# Patient Record
Sex: MALE | Race: WHITE | NOT HISPANIC OR LATINO | Employment: FULL TIME | URBAN - NONMETROPOLITAN AREA
[De-identification: names, ages, dates, MRNs, and addresses within clinical notes are randomized per-mention and may not be internally consistent; named-entity substitution may affect disease eponyms.]

---

## 2020-11-14 ENCOUNTER — HOSPITAL ENCOUNTER (OUTPATIENT)
Facility: HOSPITAL | Age: 55
Setting detail: OBSERVATION
Discharge: HOME | End: 2020-11-17
Attending: EMERGENCY MEDICINE | Admitting: FAMILY MEDICINE

## 2020-11-14 ENCOUNTER — APPOINTMENT (OUTPATIENT)
Dept: GENERAL RADIOLOGY | Facility: HOSPITAL | Age: 55
End: 2020-11-14

## 2020-11-14 ENCOUNTER — APPOINTMENT (OUTPATIENT)
Dept: CT IMAGING | Facility: HOSPITAL | Age: 55
End: 2020-11-14

## 2020-11-14 DIAGNOSIS — R07.9 CHEST PAIN, UNSPECIFIED TYPE: ICD-10-CM

## 2020-11-14 DIAGNOSIS — I10 ACCELERATED HYPERTENSION: Primary | ICD-10-CM

## 2020-11-14 PROBLEM — R07.89 CHEST PAIN, ATYPICAL: Status: ACTIVE | Noted: 2020-11-14

## 2020-11-14 PROBLEM — E11.9 DM (DIABETES MELLITUS) (HCC): Status: ACTIVE | Noted: 2020-11-14

## 2020-11-14 LAB
ALBUMIN SERPL-MCNC: 4.5 G/DL (ref 3.5–5.2)
ALBUMIN/GLOB SERPL: 1.8 G/DL
ALP SERPL-CCNC: 72 U/L (ref 39–117)
ALT SERPL W P-5'-P-CCNC: 118 U/L (ref 1–41)
ANION GAP SERPL CALCULATED.3IONS-SCNC: 12 MMOL/L (ref 5–15)
AST SERPL-CCNC: 60 U/L (ref 1–40)
BASOPHILS # BLD AUTO: 0.15 10*3/MM3 (ref 0–0.2)
BASOPHILS NFR BLD AUTO: 1.5 % (ref 0–1.5)
BILIRUB SERPL-MCNC: 0.4 MG/DL (ref 0–1.2)
BUN SERPL-MCNC: 13 MG/DL (ref 6–20)
BUN/CREAT SERPL: 18.1 (ref 7–25)
CALCIUM SPEC-SCNC: 9.1 MG/DL (ref 8.6–10.5)
CHLORIDE SERPL-SCNC: 100 MMOL/L (ref 98–107)
CO2 SERPL-SCNC: 27 MMOL/L (ref 22–29)
CREAT SERPL-MCNC: 0.72 MG/DL (ref 0.76–1.27)
DEPRECATED RDW RBC AUTO: 45.8 FL (ref 37–54)
EOSINOPHIL # BLD AUTO: 0.33 10*3/MM3 (ref 0–0.4)
EOSINOPHIL NFR BLD AUTO: 3.2 % (ref 0.3–6.2)
ERYTHROCYTE [DISTWIDTH] IN BLOOD BY AUTOMATED COUNT: 15.3 % (ref 12.3–15.4)
GFR SERPL CREATININE-BSD FRML MDRD: 113 ML/MIN/1.73
GFR SERPL CREATININE-BSD FRML MDRD: 137 ML/MIN/1.73
GLOBULIN UR ELPH-MCNC: 2.5 GM/DL
GLUCOSE SERPL-MCNC: 135 MG/DL (ref 65–99)
HCT VFR BLD AUTO: 54.1 % (ref 37.5–51)
HGB BLD-MCNC: 17.9 G/DL (ref 13–17.7)
HOLD SPECIMEN: NORMAL
HOLD SPECIMEN: NORMAL
IMM GRANULOCYTES # BLD AUTO: 0.04 10*3/MM3 (ref 0–0.05)
IMM GRANULOCYTES NFR BLD AUTO: 0.4 % (ref 0–0.5)
LYMPHOCYTES # BLD AUTO: 2.64 10*3/MM3 (ref 0.7–3.1)
LYMPHOCYTES NFR BLD AUTO: 26 % (ref 19.6–45.3)
MCH RBC QN AUTO: 28.5 PG (ref 26.6–33)
MCHC RBC AUTO-ENTMCNC: 33.1 G/DL (ref 31.5–35.7)
MCV RBC AUTO: 86.1 FL (ref 79–97)
MONOCYTES # BLD AUTO: 0.88 10*3/MM3 (ref 0.1–0.9)
MONOCYTES NFR BLD AUTO: 8.7 % (ref 5–12)
NEUTROPHILS NFR BLD AUTO: 6.13 10*3/MM3 (ref 1.7–7)
NEUTROPHILS NFR BLD AUTO: 60.2 % (ref 42.7–76)
NRBC BLD AUTO-RTO: 0 /100 WBC (ref 0–0.2)
PLATELET # BLD AUTO: 183 10*3/MM3 (ref 140–450)
PMV BLD AUTO: 10.5 FL (ref 6–12)
POTASSIUM SERPL-SCNC: 3.8 MMOL/L (ref 3.5–5.2)
PROT SERPL-MCNC: 7 G/DL (ref 6–8.5)
RBC # BLD AUTO: 6.28 10*6/MM3 (ref 4.14–5.8)
SARS-COV-2 RNA PNL SPEC NAA+PROBE: NOT DETECTED
SODIUM SERPL-SCNC: 139 MMOL/L (ref 136–145)
TROPONIN T SERPL-MCNC: <0.01 NG/ML (ref 0–0.03)
TROPONIN T SERPL-MCNC: <0.01 NG/ML (ref 0–0.03)
WBC # BLD AUTO: 10.17 10*3/MM3 (ref 3.4–10.8)
WHOLE BLOOD HOLD SPECIMEN: NORMAL
WHOLE BLOOD HOLD SPECIMEN: NORMAL

## 2020-11-14 PROCEDURE — C9803 HOPD COVID-19 SPEC COLLECT: HCPCS

## 2020-11-14 PROCEDURE — 87635 SARS-COV-2 COVID-19 AMP PRB: CPT | Performed by: EMERGENCY MEDICINE

## 2020-11-14 PROCEDURE — 71275 CT ANGIOGRAPHY CHEST: CPT

## 2020-11-14 PROCEDURE — 71045 X-RAY EXAM CHEST 1 VIEW: CPT

## 2020-11-14 PROCEDURE — 93010 ELECTROCARDIOGRAM REPORT: CPT | Performed by: INTERNAL MEDICINE

## 2020-11-14 PROCEDURE — 96365 THER/PROPH/DIAG IV INF INIT: CPT

## 2020-11-14 PROCEDURE — 85025 COMPLETE CBC W/AUTO DIFF WBC: CPT | Performed by: EMERGENCY MEDICINE

## 2020-11-14 PROCEDURE — 93005 ELECTROCARDIOGRAM TRACING: CPT | Performed by: EMERGENCY MEDICINE

## 2020-11-14 PROCEDURE — G0378 HOSPITAL OBSERVATION PER HR: HCPCS

## 2020-11-14 PROCEDURE — 99285 EMERGENCY DEPT VISIT HI MDM: CPT

## 2020-11-14 PROCEDURE — 80053 COMPREHEN METABOLIC PANEL: CPT | Performed by: EMERGENCY MEDICINE

## 2020-11-14 PROCEDURE — 96375 TX/PRO/DX INJ NEW DRUG ADDON: CPT

## 2020-11-14 PROCEDURE — 0 IOPAMIDOL PER 1 ML: Performed by: EMERGENCY MEDICINE

## 2020-11-14 PROCEDURE — 93005 ELECTROCARDIOGRAM TRACING: CPT | Performed by: FAMILY MEDICINE

## 2020-11-14 PROCEDURE — 84484 ASSAY OF TROPONIN QUANT: CPT | Performed by: EMERGENCY MEDICINE

## 2020-11-14 RX ORDER — NITROGLYCERIN 20 MG/100ML
10-50 INJECTION INTRAVENOUS
Status: DISCONTINUED | OUTPATIENT
Start: 2020-11-14 | End: 2020-11-15

## 2020-11-14 RX ORDER — METFORMIN HYDROCHLORIDE 500 MG/1
1000 TABLET, EXTENDED RELEASE ORAL 2 TIMES DAILY
COMMUNITY

## 2020-11-14 RX ORDER — SODIUM CHLORIDE 0.9 % (FLUSH) 0.9 %
10 SYRINGE (ML) INJECTION AS NEEDED
Status: CANCELLED | OUTPATIENT
Start: 2020-11-14

## 2020-11-14 RX ORDER — FEXOFENADINE HCL 180 MG/1
180 TABLET ORAL DAILY
COMMUNITY

## 2020-11-14 RX ORDER — LABETALOL HYDROCHLORIDE 5 MG/ML
10 INJECTION, SOLUTION INTRAVENOUS EVERY 4 HOURS PRN
Status: CANCELLED | OUTPATIENT
Start: 2020-11-14

## 2020-11-14 RX ORDER — ASPIRIN 81 MG/1
324 TABLET, CHEWABLE ORAL ONCE
Status: COMPLETED | OUTPATIENT
Start: 2020-11-14 | End: 2020-11-14

## 2020-11-14 RX ORDER — LISINOPRIL 20 MG/1
20 TABLET ORAL 2 TIMES DAILY
COMMUNITY

## 2020-11-14 RX ORDER — HYDRALAZINE HYDROCHLORIDE 10 MG/1
20 TABLET, FILM COATED ORAL EVERY 6 HOURS PRN
Status: CANCELLED | OUTPATIENT
Start: 2020-11-14

## 2020-11-14 RX ORDER — LISINOPRIL 10 MG/1
20 TABLET ORAL 2 TIMES DAILY
Status: CANCELLED | OUTPATIENT
Start: 2020-11-14

## 2020-11-14 RX ORDER — TAMSULOSIN HYDROCHLORIDE 0.4 MG/1
0.4 CAPSULE ORAL DAILY
Status: CANCELLED | OUTPATIENT
Start: 2020-11-14

## 2020-11-14 RX ORDER — ONDANSETRON 2 MG/ML
4 INJECTION INTRAMUSCULAR; INTRAVENOUS ONCE
Status: DISCONTINUED | OUTPATIENT
Start: 2020-11-14 | End: 2020-11-17 | Stop reason: HOSPADM

## 2020-11-14 RX ORDER — TAMSULOSIN HYDROCHLORIDE 0.4 MG/1
1 CAPSULE ORAL DAILY
COMMUNITY

## 2020-11-14 RX ORDER — FAMOTIDINE 20 MG/1
20 TABLET, FILM COATED ORAL 2 TIMES DAILY
Status: CANCELLED | OUTPATIENT
Start: 2020-11-14

## 2020-11-14 RX ORDER — UBIDECARENONE 75 MG
50 CAPSULE ORAL DAILY
COMMUNITY

## 2020-11-14 RX ORDER — INSULIN DEGLUDEC INJECTION 100 U/ML
100 INJECTION, SOLUTION SUBCUTANEOUS DAILY
COMMUNITY

## 2020-11-14 RX ORDER — ASPIRIN 81 MG/1
81 TABLET, CHEWABLE ORAL DAILY
COMMUNITY

## 2020-11-14 RX ORDER — SODIUM CHLORIDE, SODIUM LACTATE, POTASSIUM CHLORIDE, CALCIUM CHLORIDE 600; 310; 30; 20 MG/100ML; MG/100ML; MG/100ML; MG/100ML
100 INJECTION, SOLUTION INTRAVENOUS CONTINUOUS
Status: CANCELLED | OUTPATIENT
Start: 2020-11-14

## 2020-11-14 RX ORDER — ROSUVASTATIN CALCIUM 10 MG/1
10 TABLET, COATED ORAL DAILY
Status: CANCELLED | OUTPATIENT
Start: 2020-11-14

## 2020-11-14 RX ORDER — BUSPIRONE HYDROCHLORIDE 15 MG/1
15 TABLET ORAL 2 TIMES DAILY
COMMUNITY

## 2020-11-14 RX ORDER — LABETALOL HYDROCHLORIDE 5 MG/ML
20 INJECTION, SOLUTION INTRAVENOUS ONCE
Status: COMPLETED | OUTPATIENT
Start: 2020-11-14 | End: 2020-11-14

## 2020-11-14 RX ORDER — DEXTROSE MONOHYDRATE 25 G/50ML
25 INJECTION, SOLUTION INTRAVENOUS
Status: CANCELLED | OUTPATIENT
Start: 2020-11-14

## 2020-11-14 RX ORDER — ROSUVASTATIN CALCIUM 10 MG/1
10 TABLET, COATED ORAL DAILY
COMMUNITY

## 2020-11-14 RX ORDER — ASPIRIN 81 MG/1
81 TABLET, CHEWABLE ORAL DAILY
Status: CANCELLED | OUTPATIENT
Start: 2020-11-14

## 2020-11-14 RX ORDER — SODIUM CHLORIDE 0.9 % (FLUSH) 0.9 %
10 SYRINGE (ML) INJECTION EVERY 12 HOURS SCHEDULED
Status: CANCELLED | OUTPATIENT
Start: 2020-11-14

## 2020-11-14 RX ORDER — ENALAPRILAT 2.5 MG/2ML
1.25 INJECTION INTRAVENOUS ONCE
Status: COMPLETED | OUTPATIENT
Start: 2020-11-14 | End: 2020-11-14

## 2020-11-14 RX ORDER — NICOTINE POLACRILEX 4 MG
15 LOZENGE BUCCAL
Status: CANCELLED | OUTPATIENT
Start: 2020-11-14

## 2020-11-14 RX ORDER — AMLODIPINE BESYLATE 5 MG/1
5 TABLET ORAL
Status: CANCELLED | OUTPATIENT
Start: 2020-11-14

## 2020-11-14 RX ORDER — ONDANSETRON 2 MG/ML
4 INJECTION INTRAMUSCULAR; INTRAVENOUS EVERY 6 HOURS PRN
Status: CANCELLED | OUTPATIENT
Start: 2020-11-14

## 2020-11-14 RX ORDER — SODIUM CHLORIDE 0.9 % (FLUSH) 0.9 %
10 SYRINGE (ML) INJECTION AS NEEDED
Status: DISCONTINUED | OUTPATIENT
Start: 2020-11-14 | End: 2020-11-17 | Stop reason: HOSPADM

## 2020-11-14 RX ADMIN — NITROGLYCERIN 10 MCG/MIN: 20 INJECTION INTRAVENOUS at 23:12

## 2020-11-14 RX ADMIN — LABETALOL HYDROCHLORIDE 20 MG: 5 INJECTION, SOLUTION INTRAVENOUS at 18:07

## 2020-11-14 RX ADMIN — IOPAMIDOL 100 ML: 755 INJECTION, SOLUTION INTRAVENOUS at 18:10

## 2020-11-14 RX ADMIN — ASPIRIN 81 MG 324 MG: 81 TABLET ORAL at 17:30

## 2020-11-14 RX ADMIN — ENALAPRILAT 1.25 MG: 2.5 INJECTION INTRAVENOUS at 17:31

## 2020-11-14 NOTE — ED PROVIDER NOTES
Subjective   This gentleman 55 years old came to the ER with elevated blood pressure chest tightness and pressure he is got history of diabetes hypertension hypercholesterolemia obesity came the ER for evaluation of complaints.      Chest Pain  Pain location:  Substernal area  Pain quality: pressure    Pain radiates to:  Neck  Pain severity:  Moderate  Onset quality:  Gradual  Timing:  Constant  Progression:  Worsening  Chronicity:  New  Context: not breathing, not drug use, not eating, not intercourse, not lifting, not movement, not raising an arm, not at rest, not stress and not trauma    Relieved by:  Nothing  Worsened by:  Nothing  Ineffective treatments:  None tried  Associated symptoms: cough and shortness of breath    Associated symptoms: no abdominal pain, no AICD problem, no anorexia, no anxiety, no back pain, no claudication, no dysphagia, no fever, no headache, no heartburn, no lower extremity edema, no nausea, no numbness, no orthopnea, no palpitations and no weakness    Risk factors: diabetes mellitus, high cholesterol, hypertension and male sex    Risk factors: no aortic disease, no coronary artery disease and no Marfan's syndrome    Hypertension  Associated symptoms: chest pain and shortness of breath    Associated symptoms: no abdominal pain, no fever, no headaches, no nausea, no neck pain, no palpitations and no weakness        Review of Systems   Constitutional: Negative.  Negative for fever.   HENT: Negative.  Negative for trouble swallowing.    Respiratory: Positive for cough and shortness of breath.    Cardiovascular: Positive for chest pain. Negative for palpitations, orthopnea and claudication.   Gastrointestinal: Negative.  Negative for abdominal distention, abdominal pain, anorexia, heartburn and nausea.   Endocrine: Negative.    Genitourinary: Negative.    Musculoskeletal: Negative.  Negative for back pain and neck pain.   Skin: Negative for color change and pallor.   Neurological:  Negative.  Negative for syncope, weakness, light-headedness, numbness and headaches.   Hematological: Negative.  Does not bruise/bleed easily.   All other systems reviewed and are negative.      Past Medical History:   Diagnosis Date   • Anxiety    • Arthritis    • Asthma     athletic induced   • BPH (benign prostatic hyperplasia)    • Depression    • Diabetes mellitus (CMS/HCC)    • History of seasonal allergies    • Hyperlipidemia    • Hypertension        Allergies   Allergen Reactions   • Penicillins Hives       Past Surgical History:   Procedure Laterality Date   • CARPAL TUNNEL RELEASE Bilateral 01/01/2020       Family History   Problem Relation Age of Onset   • Cancer Father        Social History     Socioeconomic History   • Marital status:      Spouse name: Not on file   • Number of children: Not on file   • Years of education: Not on file   • Highest education level: Not on file   Tobacco Use   • Smoking status: Former Smoker     Types: Cigarettes   • Smokeless tobacco: Never Used   Substance and Sexual Activity   • Alcohol use: Yes     Comment: occasionally   • Drug use: Never   • Sexual activity: Defer           Objective   Physical Exam  Vitals signs and nursing note reviewed. Exam conducted with a chaperone present.   Constitutional:       General: He is not in acute distress.     Appearance: Normal appearance. He is well-developed. He is not toxic-appearing.   HENT:      Head: Normocephalic and atraumatic.      Nose: Nose normal.      Mouth/Throat:      Mouth: Mucous membranes are moist.      Pharynx: Uvula midline.   Eyes:      General: Lids are normal. Lids are everted, no foreign bodies appreciated.      Conjunctiva/sclera: Conjunctivae normal.      Pupils: Pupils are equal, round, and reactive to light.   Neck:      Musculoskeletal: Full passive range of motion without pain, normal range of motion and neck supple. No neck rigidity.      Vascular: Normal carotid pulses. No carotid bruit or  JVD.      Trachea: Trachea and phonation normal. No tracheal deviation.   Cardiovascular:      Rate and Rhythm: Normal rate and regular rhythm.      Chest Wall: PMI is not displaced.      Pulses: Normal pulses.      Heart sounds: Normal heart sounds. No diastolic murmur. No gallop.       Comments: Pulsatile mass noted a femoral delay  Pulmonary:      Effort: Pulmonary effort is normal. No tachypnea, accessory muscle usage or respiratory distress.      Breath sounds: Normal breath sounds. No stridor. No decreased breath sounds, wheezing, rhonchi or rales.   Abdominal:      General: Bowel sounds are normal. There is no distension.      Palpations: Abdomen is soft.      Tenderness: There is no abdominal tenderness.   Musculoskeletal: Normal range of motion.         General: No swelling.      Comments: Lower extremity exam bilaterally is unremarkable.  There is no right or left calf tenderness .  There is no palpable venous cord.  No obvious difference in the size of the legs.  No pitting edema.  The dorsalis pedis and posterior tibial femoral and popliteal pulses are palpable and +2 bilaterally.  Homans sign is negative   Skin:     General: Skin is warm and dry.      Capillary Refill: Capillary refill takes less than 2 seconds.      Coloration: Skin is not jaundiced or pale.      Nails: There is no clubbing.     Neurological:      General: No focal deficit present.      Mental Status: He is alert and oriented to person, place, and time.      GCS: GCS eye subscore is 4. GCS verbal subscore is 5. GCS motor subscore is 6.      Cranial Nerves: Cranial nerves are intact. No cranial nerve deficit.      Motor: Motor function is intact.      Gait: Gait normal.      Deep Tendon Reflexes: Reflexes are normal and symmetric. Reflexes normal.   Psychiatric:         Speech: Speech normal.         Behavior: Behavior normal.         Procedures           ED Course  ED Course as of Nov 14 1837   Sat Nov 14, 2020   1628 Normal sinus  rhythm rate of 92 bpm nonspecific changes    [TS]   1811 This gentleman came to the ER with chest pain and discomfort with elevated blood pressure CT of the chest was done pending radiology report I do not see any obvious evidence of dissection or pulmonary embolus.  The hospitalist will follow-up on the final report of the CT scan.  His Covid testing is still pending he does not have any Covid symptoms blood pressure has been controlled with visit Vasotec and labetalol he received some morphine.  Once his CT report is available he will be given Lovenox.  Currently he is pain-free and not any distress.    [TS]   1812 I have discussed this case at length with the patient and he is agreeable with the plan formulated    [TS]   1834 Patient patient blood pressure is controlled    [TS]      ED Course User Index  [TS] Minesh Cleary MD                                         HEART Score (for prediction of 6-week risk of major adverse cardiac event) reviewed and/or performed as part of the patient evaluation and treatment planning process.  The result associated with this review/performance is: 4       MDM  Number of Diagnoses or Management Options  Diagnosis management comments: Differential Diagnosis:  I considered chest wall pain, muscle strain, costochondritis, pleurisy, rib fracture, herpes zoster, cardiovascular etiology, myocardial infarction, intermediate coronary syndrome, unstable angina, angina, aortic dissection, pericarditis, pulmonary etiology, pulmonary embolism, pneumonia, pneumothorax, lung cancer, gastroesophageal reflux disease, esophagitis, esophageal spasm and gastrointestinal etiology as a possible cause of chest pain in this patient. This is a partial list of diagnoses considered.         Amount and/or Complexity of Data Reviewed  Clinical lab tests: ordered and reviewed  Tests in the radiology section of CPT®: ordered and reviewed  Tests in the medicine section of CPT®: reviewed and ordered    Risk  of Complications, Morbidity, and/or Mortality  Presenting problems: moderate  Diagnostic procedures: moderate  Management options: moderate        Final diagnoses:   Accelerated hypertension   Chest pain, unspecified type            Minesh Cleary MD  11/14/20 1813       Minesh Cleary MD  11/14/20 1837

## 2020-11-15 ENCOUNTER — APPOINTMENT (OUTPATIENT)
Dept: CARDIOLOGY | Facility: HOSPITAL | Age: 55
End: 2020-11-15

## 2020-11-15 LAB
ALBUMIN SERPL-MCNC: 4.1 G/DL (ref 3.5–5.2)
ALBUMIN/GLOB SERPL: 1.9 G/DL
ALP SERPL-CCNC: 54 U/L (ref 39–117)
ALT SERPL W P-5'-P-CCNC: 110 U/L (ref 1–41)
ANION GAP SERPL CALCULATED.3IONS-SCNC: 9 MMOL/L (ref 5–15)
AST SERPL-CCNC: 60 U/L (ref 1–40)
BASOPHILS # BLD AUTO: 0.11 10*3/MM3 (ref 0–0.2)
BASOPHILS NFR BLD AUTO: 1.2 % (ref 0–1.5)
BH CV ECHO MEAS - AO MAX PG (FULL): 3.4 MMHG
BH CV ECHO MEAS - AO MAX PG: 7.2 MMHG
BH CV ECHO MEAS - AO MEAN PG (FULL): 3.5 MMHG
BH CV ECHO MEAS - AO MEAN PG: 5.5 MMHG
BH CV ECHO MEAS - AO ROOT AREA (BSA CORRECTED): 1.3
BH CV ECHO MEAS - AO ROOT AREA: 7.5 CM^2
BH CV ECHO MEAS - AO ROOT DIAM: 3.1 CM
BH CV ECHO MEAS - AO V2 MAX: 134 CM/SEC
BH CV ECHO MEAS - AO V2 MEAN: 116.5 CM/SEC
BH CV ECHO MEAS - AO V2 VTI: 36 CM
BH CV ECHO MEAS - AVA(I,A): 2.1 CM^2
BH CV ECHO MEAS - AVA(I,D): 2.1 CM^2
BH CV ECHO MEAS - AVA(V,A): 2.5 CM^2
BH CV ECHO MEAS - AVA(V,D): 2.5 CM^2
BH CV ECHO MEAS - BSA(HAYCOCK): 2.6 M^2
BH CV ECHO MEAS - BSA: 2.5 M^2
BH CV ECHO MEAS - BZI_BMI: 38.2 KILOGRAMS/M^2
BH CV ECHO MEAS - BZI_METRIC_HEIGHT: 182.9 CM
BH CV ECHO MEAS - BZI_METRIC_WEIGHT: 127.9 KG
BH CV ECHO MEAS - EDV(CUBED): 40.7 ML
BH CV ECHO MEAS - EDV(MOD-SP4): 162 ML
BH CV ECHO MEAS - EDV(TEICH): 48.8 ML
BH CV ECHO MEAS - EF(MOD-SP4): 65.4 %
BH CV ECHO MEAS - ESV(MOD-SP4): 56 ML
BH CV ECHO MEAS - IVS/LVPW: 0.97
BH CV ECHO MEAS - IVSD: 1.7 CM
BH CV ECHO MEAS - LA DIMENSION: 3.3 CM
BH CV ECHO MEAS - LA/AO: 1.1
BH CV ECHO MEAS - LAT PEAK E' VEL: 9.3 CM/SEC
BH CV ECHO MEAS - LV DIASTOLIC VOL/BSA (35-75): 65.7 ML/M^2
BH CV ECHO MEAS - LV MASS(C)D: 241.2 GRAMS
BH CV ECHO MEAS - LV MASS(C)DI: 97.9 GRAMS/M^2
BH CV ECHO MEAS - LV MAX PG: 3.8 MMHG
BH CV ECHO MEAS - LV MEAN PG: 2 MMHG
BH CV ECHO MEAS - LV SYSTOLIC VOL/BSA (12-30): 22.7 ML/M^2
BH CV ECHO MEAS - LV V1 MAX: 97.5 CM/SEC
BH CV ECHO MEAS - LV V1 MEAN: 72.2 CM/SEC
BH CV ECHO MEAS - LV V1 VTI: 21.9 CM
BH CV ECHO MEAS - LVIDD: 3.4 CM
BH CV ECHO MEAS - LVLD AP4: 9.5 CM
BH CV ECHO MEAS - LVLS AP4: 8.1 CM
BH CV ECHO MEAS - LVOT AREA (M): 3.5 CM^2
BH CV ECHO MEAS - LVOT AREA: 3.5 CM^2
BH CV ECHO MEAS - LVOT DIAM: 2.1 CM
BH CV ECHO MEAS - LVPWD: 1.8 CM
BH CV ECHO MEAS - MED PEAK E' VEL: 7.8 CM/SEC
BH CV ECHO MEAS - MV A MAX VEL: 67.7 CM/SEC
BH CV ECHO MEAS - MV DEC SLOPE: 532 CM/SEC^2
BH CV ECHO MEAS - MV DEC TIME: 0.18 SEC
BH CV ECHO MEAS - MV E MAX VEL: 93.5 CM/SEC
BH CV ECHO MEAS - MV E/A: 1.4
BH CV ECHO MEAS - RAP SYSTOLE: 10 MMHG
BH CV ECHO MEAS - RVSP: 29.2 MMHG
BH CV ECHO MEAS - SI(AO): 110.1 ML/M^2
BH CV ECHO MEAS - SI(LVOT): 30.8 ML/M^2
BH CV ECHO MEAS - SI(MOD-SP4): 43 ML/M^2
BH CV ECHO MEAS - SV(AO): 271.3 ML
BH CV ECHO MEAS - SV(LVOT): 75.9 ML
BH CV ECHO MEAS - SV(MOD-SP4): 106 ML
BH CV ECHO MEAS - TR MAX VEL: 219 CM/SEC
BH CV ECHO MEASUREMENTS AVERAGE E/E' RATIO: 10.94
BILIRUB SERPL-MCNC: 0.4 MG/DL (ref 0–1.2)
BUN SERPL-MCNC: 14 MG/DL (ref 6–20)
BUN/CREAT SERPL: 20 (ref 7–25)
CALCIUM SPEC-SCNC: 8.6 MG/DL (ref 8.6–10.5)
CHLORIDE SERPL-SCNC: 103 MMOL/L (ref 98–107)
CHOLEST SERPL-MCNC: 115 MG/DL (ref 0–200)
CO2 SERPL-SCNC: 29 MMOL/L (ref 22–29)
CREAT SERPL-MCNC: 0.7 MG/DL (ref 0.76–1.27)
DEPRECATED RDW RBC AUTO: 48.7 FL (ref 37–54)
EOSINOPHIL # BLD AUTO: 0.34 10*3/MM3 (ref 0–0.4)
EOSINOPHIL NFR BLD AUTO: 3.7 % (ref 0.3–6.2)
ERYTHROCYTE [DISTWIDTH] IN BLOOD BY AUTOMATED COUNT: 15.4 % (ref 12.3–15.4)
GFR SERPL CREATININE-BSD FRML MDRD: 117 ML/MIN/1.73
GLOBULIN UR ELPH-MCNC: 2.2 GM/DL
GLUCOSE BLDC GLUCOMTR-MCNC: 125 MG/DL (ref 70–130)
GLUCOSE BLDC GLUCOMTR-MCNC: 131 MG/DL (ref 70–130)
GLUCOSE BLDC GLUCOMTR-MCNC: 175 MG/DL (ref 70–130)
GLUCOSE BLDC GLUCOMTR-MCNC: 228 MG/DL (ref 70–130)
GLUCOSE SERPL-MCNC: 149 MG/DL (ref 65–99)
HBA1C MFR BLD: 7.6 % (ref 4.8–5.6)
HCT VFR BLD AUTO: 53.6 % (ref 37.5–51)
HDLC SERPL-MCNC: 26 MG/DL (ref 40–60)
HGB BLD-MCNC: 17.3 G/DL (ref 13–17.7)
IMM GRANULOCYTES # BLD AUTO: 0.05 10*3/MM3 (ref 0–0.05)
IMM GRANULOCYTES NFR BLD AUTO: 0.5 % (ref 0–0.5)
LDLC SERPL CALC-MCNC: 68 MG/DL (ref 0–100)
LDLC/HDLC SERPL: 2.58 {RATIO}
LEFT ATRIUM VOLUME INDEX: 18.9 ML/M2
LEFT ATRIUM VOLUME: 46.8 CM3
LYMPHOCYTES # BLD AUTO: 2.28 10*3/MM3 (ref 0.7–3.1)
LYMPHOCYTES NFR BLD AUTO: 24.9 % (ref 19.6–45.3)
MAXIMAL PREDICTED HEART RATE: 164 BPM
MCH RBC QN AUTO: 28.4 PG (ref 26.6–33)
MCHC RBC AUTO-ENTMCNC: 32.3 G/DL (ref 31.5–35.7)
MCV RBC AUTO: 88 FL (ref 79–97)
MONOCYTES # BLD AUTO: 0.9 10*3/MM3 (ref 0.1–0.9)
MONOCYTES NFR BLD AUTO: 9.8 % (ref 5–12)
NEUTROPHILS NFR BLD AUTO: 5.47 10*3/MM3 (ref 1.7–7)
NEUTROPHILS NFR BLD AUTO: 59.9 % (ref 42.7–76)
NRBC BLD AUTO-RTO: 0 /100 WBC (ref 0–0.2)
PLATELET # BLD AUTO: 162 10*3/MM3 (ref 140–450)
PMV BLD AUTO: 10.6 FL (ref 6–12)
POTASSIUM SERPL-SCNC: 3.9 MMOL/L (ref 3.5–5.2)
PROT SERPL-MCNC: 6.3 G/DL (ref 6–8.5)
QT INTERVAL: 350 MS
QT INTERVAL: 350 MS
QT INTERVAL: 354 MS
QTC INTERVAL: 423 MS
QTC INTERVAL: 432 MS
QTC INTERVAL: 442 MS
RBC # BLD AUTO: 6.09 10*6/MM3 (ref 4.14–5.8)
SODIUM SERPL-SCNC: 141 MMOL/L (ref 136–145)
STRESS TARGET HR: 139 BPM
T4 FREE SERPL-MCNC: 1.02 NG/DL (ref 0.93–1.7)
TRIGL SERPL-MCNC: 110 MG/DL (ref 0–150)
TSH SERPL DL<=0.05 MIU/L-ACNC: 4.63 UIU/ML (ref 0.27–4.2)
VLDLC SERPL-MCNC: 21 MG/DL (ref 5–40)
WBC # BLD AUTO: 9.15 10*3/MM3 (ref 3.4–10.8)

## 2020-11-15 PROCEDURE — 93306 TTE W/DOPPLER COMPLETE: CPT | Performed by: INTERNAL MEDICINE

## 2020-11-15 PROCEDURE — 96376 TX/PRO/DX INJ SAME DRUG ADON: CPT

## 2020-11-15 PROCEDURE — 25010000003 DOBUTAMINE PER 250 MG: Performed by: FAMILY MEDICINE

## 2020-11-15 PROCEDURE — 84439 ASSAY OF FREE THYROXINE: CPT | Performed by: FAMILY MEDICINE

## 2020-11-15 PROCEDURE — G0378 HOSPITAL OBSERVATION PER HR: HCPCS

## 2020-11-15 PROCEDURE — 83036 HEMOGLOBIN GLYCOSYLATED A1C: CPT | Performed by: FAMILY MEDICINE

## 2020-11-15 PROCEDURE — 82962 GLUCOSE BLOOD TEST: CPT

## 2020-11-15 PROCEDURE — 80061 LIPID PANEL: CPT | Performed by: FAMILY MEDICINE

## 2020-11-15 PROCEDURE — 96366 THER/PROPH/DIAG IV INF ADDON: CPT

## 2020-11-15 PROCEDURE — 85025 COMPLETE CBC W/AUTO DIFF WBC: CPT | Performed by: FAMILY MEDICINE

## 2020-11-15 PROCEDURE — 96361 HYDRATE IV INFUSION ADD-ON: CPT

## 2020-11-15 PROCEDURE — 63710000001 INSULIN LISPRO (HUMAN) PER 5 UNITS: Performed by: FAMILY MEDICINE

## 2020-11-15 PROCEDURE — 84443 ASSAY THYROID STIM HORMONE: CPT | Performed by: FAMILY MEDICINE

## 2020-11-15 PROCEDURE — 93306 TTE W/DOPPLER COMPLETE: CPT

## 2020-11-15 PROCEDURE — 80053 COMPREHEN METABOLIC PANEL: CPT | Performed by: FAMILY MEDICINE

## 2020-11-15 PROCEDURE — 25010000002 PERFLUTREN 6.52 MG/ML SUSPENSION: Performed by: FAMILY MEDICINE

## 2020-11-15 RX ORDER — METOPROLOL TARTRATE 5 MG/5ML
5 INJECTION INTRAVENOUS ONCE
Status: COMPLETED | OUTPATIENT
Start: 2020-11-15 | End: 2020-11-15

## 2020-11-15 RX ORDER — NITROGLYCERIN 0.4 MG/1
0.4 TABLET SUBLINGUAL
Status: DISCONTINUED | OUTPATIENT
Start: 2020-11-15 | End: 2020-11-17 | Stop reason: HOSPADM

## 2020-11-15 RX ORDER — ASPIRIN 81 MG/1
81 TABLET, CHEWABLE ORAL DAILY
Status: DISCONTINUED | OUTPATIENT
Start: 2020-11-15 | End: 2020-11-17 | Stop reason: HOSPADM

## 2020-11-15 RX ORDER — TAMSULOSIN HYDROCHLORIDE 0.4 MG/1
0.4 CAPSULE ORAL DAILY
Status: DISCONTINUED | OUTPATIENT
Start: 2020-11-15 | End: 2020-11-17 | Stop reason: HOSPADM

## 2020-11-15 RX ORDER — ROSUVASTATIN CALCIUM 10 MG/1
10 TABLET, COATED ORAL DAILY
Status: DISCONTINUED | OUTPATIENT
Start: 2020-11-15 | End: 2020-11-17 | Stop reason: HOSPADM

## 2020-11-15 RX ORDER — ONDANSETRON 2 MG/ML
4 INJECTION INTRAMUSCULAR; INTRAVENOUS EVERY 6 HOURS PRN
Status: DISCONTINUED | OUTPATIENT
Start: 2020-11-15 | End: 2020-11-17 | Stop reason: HOSPADM

## 2020-11-15 RX ORDER — FAMOTIDINE 20 MG/1
20 TABLET, FILM COATED ORAL 2 TIMES DAILY
Status: DISCONTINUED | OUTPATIENT
Start: 2020-11-15 | End: 2020-11-17 | Stop reason: HOSPADM

## 2020-11-15 RX ORDER — LISINOPRIL 20 MG/1
20 TABLET ORAL 2 TIMES DAILY
Status: DISCONTINUED | OUTPATIENT
Start: 2020-11-15 | End: 2020-11-17 | Stop reason: HOSPADM

## 2020-11-15 RX ORDER — ACETAMINOPHEN 325 MG/1
650 TABLET ORAL EVERY 6 HOURS PRN
Status: DISCONTINUED | OUTPATIENT
Start: 2020-11-15 | End: 2020-11-17 | Stop reason: HOSPADM

## 2020-11-15 RX ORDER — AMLODIPINE BESYLATE 5 MG/1
5 TABLET ORAL
Status: DISCONTINUED | OUTPATIENT
Start: 2020-11-15 | End: 2020-11-15

## 2020-11-15 RX ORDER — AMLODIPINE BESYLATE 5 MG/1
5 TABLET ORAL
Status: DISCONTINUED | OUTPATIENT
Start: 2020-11-16 | End: 2020-11-17 | Stop reason: HOSPADM

## 2020-11-15 RX ORDER — HYDRALAZINE HYDROCHLORIDE 25 MG/1
25 TABLET, FILM COATED ORAL EVERY 6 HOURS PRN
Status: DISCONTINUED | OUTPATIENT
Start: 2020-11-15 | End: 2020-11-17 | Stop reason: HOSPADM

## 2020-11-15 RX ORDER — DOBUTAMINE HYDROCHLORIDE 100 MG/100ML
5-50 INJECTION INTRAVENOUS CONTINUOUS
Status: DISCONTINUED | OUTPATIENT
Start: 2020-11-15 | End: 2020-11-15

## 2020-11-15 RX ORDER — AMLODIPINE BESYLATE 10 MG/1
10 TABLET ORAL
Status: DISCONTINUED | OUTPATIENT
Start: 2020-11-16 | End: 2020-11-15

## 2020-11-15 RX ORDER — LABETALOL HYDROCHLORIDE 5 MG/ML
10 INJECTION, SOLUTION INTRAVENOUS EVERY 4 HOURS PRN
Status: DISCONTINUED | OUTPATIENT
Start: 2020-11-15 | End: 2020-11-17 | Stop reason: HOSPADM

## 2020-11-15 RX ORDER — SODIUM CHLORIDE, SODIUM LACTATE, POTASSIUM CHLORIDE, CALCIUM CHLORIDE 600; 310; 30; 20 MG/100ML; MG/100ML; MG/100ML; MG/100ML
100 INJECTION, SOLUTION INTRAVENOUS CONTINUOUS
Status: DISCONTINUED | OUTPATIENT
Start: 2020-11-15 | End: 2020-11-16

## 2020-11-15 RX ORDER — DEXTROSE MONOHYDRATE 25 G/50ML
25 INJECTION, SOLUTION INTRAVENOUS
Status: DISCONTINUED | OUTPATIENT
Start: 2020-11-15 | End: 2020-11-17 | Stop reason: HOSPADM

## 2020-11-15 RX ORDER — METOPROLOL SUCCINATE 50 MG/1
50 TABLET, EXTENDED RELEASE ORAL
Status: DISCONTINUED | OUTPATIENT
Start: 2020-11-15 | End: 2020-11-17 | Stop reason: HOSPADM

## 2020-11-15 RX ORDER — NICOTINE POLACRILEX 4 MG
15 LOZENGE BUCCAL
Status: DISCONTINUED | OUTPATIENT
Start: 2020-11-15 | End: 2020-11-17 | Stop reason: HOSPADM

## 2020-11-15 RX ADMIN — AMLODIPINE BESYLATE 5 MG: 5 TABLET ORAL at 11:46

## 2020-11-15 RX ADMIN — Medication 10 MCG/KG/MIN: at 10:33

## 2020-11-15 RX ADMIN — LISINOPRIL 20 MG: 20 TABLET ORAL at 22:31

## 2020-11-15 RX ADMIN — BUSPIRONE HYDROCHLORIDE 15 MG: 5 TABLET ORAL at 21:23

## 2020-11-15 RX ADMIN — TAMSULOSIN HYDROCHLORIDE 0.4 MG: 0.4 CAPSULE ORAL at 11:46

## 2020-11-15 RX ADMIN — METOPROLOL SUCCINATE 50 MG: 50 TABLET, FILM COATED, EXTENDED RELEASE ORAL at 17:50

## 2020-11-15 RX ADMIN — FAMOTIDINE 20 MG: 20 TABLET, FILM COATED ORAL at 11:46

## 2020-11-15 RX ADMIN — METOPROLOL TARTRATE 5 MG: 5 INJECTION, SOLUTION INTRAVENOUS at 10:44

## 2020-11-15 RX ADMIN — ROSUVASTATIN CALCIUM 10 MG: 10 TABLET, FILM COATED ORAL at 11:47

## 2020-11-15 RX ADMIN — ACETAMINOPHEN 650 MG: 325 TABLET, FILM COATED ORAL at 01:42

## 2020-11-15 RX ADMIN — INSULIN LISPRO 2 UNITS: 100 INJECTION, SOLUTION INTRAVENOUS; SUBCUTANEOUS at 17:53

## 2020-11-15 RX ADMIN — ACETAMINOPHEN 650 MG: 325 TABLET, FILM COATED ORAL at 22:34

## 2020-11-15 RX ADMIN — LABETALOL HYDROCHLORIDE 10 MG: 5 INJECTION, SOLUTION INTRAVENOUS at 10:13

## 2020-11-15 RX ADMIN — LISINOPRIL 20 MG: 20 TABLET ORAL at 11:46

## 2020-11-15 RX ADMIN — BUSPIRONE HYDROCHLORIDE 15 MG: 5 TABLET ORAL at 11:46

## 2020-11-15 RX ADMIN — PERFLUTREN 8.48 MG: 6.52 INJECTION, SUSPENSION INTRAVENOUS at 09:14

## 2020-11-15 RX ADMIN — FAMOTIDINE 20 MG: 20 TABLET, FILM COATED ORAL at 21:23

## 2020-11-15 RX ADMIN — ACETAMINOPHEN 650 MG: 325 TABLET, FILM COATED ORAL at 10:13

## 2020-11-15 RX ADMIN — SODIUM CHLORIDE, POTASSIUM CHLORIDE, SODIUM LACTATE AND CALCIUM CHLORIDE 100 ML/HR: 600; 310; 30; 20 INJECTION, SOLUTION INTRAVENOUS at 23:46

## 2020-11-15 RX ADMIN — ASPIRIN 81 MG 81 MG: 81 TABLET ORAL at 11:50

## 2020-11-15 RX ADMIN — SODIUM CHLORIDE, POTASSIUM CHLORIDE, SODIUM LACTATE AND CALCIUM CHLORIDE 100 ML/HR: 600; 310; 30; 20 INJECTION, SOLUTION INTRAVENOUS at 11:50

## 2020-11-15 NOTE — PLAN OF CARE
Goal Outcome Evaluation:  Plan of Care Reviewed With: patient  Progress: no change  Outcome Summary: BP continues to elevated at times, pt unable to complete his stress test due to elvated bp, prn labetalol given, pt bp now in 150's, continues to have constant aching headache that gets worse when his blood pressure goes up, Denies chest pain, md dc'd his nitro drip, pt also thinks that made his h/a worse, md notified about unable to complete stress test, HR NSR in 70's safety maintained

## 2020-11-15 NOTE — PLAN OF CARE
Problem: Adult Inpatient Plan of Care  Goal: Plan of Care Review  Outcome: Ongoing, Progressing  Flowsheets (Taken 11/15/2020 0502)  Progress: no change  Plan of Care Reviewed With: patient  Outcome Summary: pt admitted to  from ER this shift for chest pain. when pt arrived on the floor his bp was 160's systolic and had no c/o chest pain. pt called out later in shift w/ chest pain and his bp was 170's systolic/100's diastolic. Notified MD and ordered nitro drip. pt is tolerating nitro drip well, bp is now 130's-150's systolic w/ no chest pain. pt has been NPO since midnight, anticipating stress test this AM. pt has rested well on CPAP from home. NSR/ST  on tele. no orders have been put in to resume home meds. safety maintained. continue to monitor.

## 2020-11-15 NOTE — NURSING NOTE
Labetolol given pretest with good results. Dobutamine started, pt c/o severe headache and needed to stop. Severe hypertension during recovery improved with lopressor 5 mg IV, headache improving. Primary RN notified of inability to complete stress.

## 2020-11-15 NOTE — H&P
[facility] Medicine Services  HISTORY AND PHYSICAL    Date of Admission: 11/14/2020  Primary Care Physician: Aurelia Devi APRN    Subjective     Chief Complaint: Chest pain.  Hypertension.    History of Present Illness  Patient is a 55-year-old  male presented here with elevated blood pressure and chest pressure.  Patient stated previous chest pressure not pain started at 4 AM this morning.  Radiate to the left arm and neck.  Currently patient states those pressure resolved.  Patient has a past medical history of diabetes/hypertension and hyperlipidemia.  Patient is also obese.  Patient presents ER to be evaluated.  Patient is got the pain is substernal chest pressure like.  The pain does radiate the patient's neck.  Patient also complaining coughing symptoms.    Labs shows polycythemia.    Review of Systems   Constitutional: Positive for activity change, appetite change and fatigue. Negative for chills and fever.   HENT: Negative for hearing loss, nosebleeds, tinnitus and trouble swallowing.    Eyes: Negative for visual disturbance.   Respiratory: Negative for cough, chest tightness, shortness of breath and wheezing.    Cardiovascular: Negative for chest pain, palpitations and leg swelling.   Gastrointestinal: Negative for abdominal distention, abdominal pain, blood in stool, constipation, diarrhea, nausea and vomiting.   Endocrine: Negative for cold intolerance, heat intolerance, polydipsia, polyphagia and polyuria.   Genitourinary: Negative for decreased urine volume, difficulty urinating, dysuria, flank pain, frequency and hematuria.   Musculoskeletal: Negative for arthralgias, joint swelling and myalgias.   Skin: Negative for rash.   Allergic/Immunologic: Negative for immunocompromised state.   Neurological: Positive for weakness. Negative for dizziness, syncope, light-headedness and headaches.   Hematological: Negative for adenopathy. Does not bruise/bleed  easily.   Psychiatric/Behavioral: Negative for confusion and sleep disturbance. The patient is not nervous/anxious.         Otherwise complete ROS reviewed and negative except as mentioned in the HPI.      Past Medical History:   Past Medical History:   Diagnosis Date   • Anxiety    • Arthritis    • Asthma     athletic induced   • BPH (benign prostatic hyperplasia)    • Depression    • Diabetes mellitus (CMS/HCC)    • History of seasonal allergies    • Hyperlipidemia    • Hypertension        Past Surgical History:  Past Surgical History:   Procedure Laterality Date   • CARPAL TUNNEL RELEASE Bilateral 01/01/2020       Family History: family history includes Cancer in his father.    Social History:  reports that he has quit smoking. His smoking use included cigarettes. He has never used smokeless tobacco. He reports current alcohol use. He reports that he does not use drugs.    Medications:  Prior to Admission medications    Medication Sig Start Date End Date Taking? Authorizing Provider   aspirin 81 MG chewable tablet Chew 81 mg Daily.   Yes Sarah Thomson MD   busPIRone (BUSPAR) 15 MG tablet Take 15 mg by mouth 3 (Three) Times a Day.   Yes Sarah Thomson MD   Cholecalciferol (vitamin D3) 125 MCG (5000 UT) capsule capsule Take 5,000 Units by mouth Daily.   Yes Sarah Thomson MD   DICLOFENAC SODIUM ER PO Take 75 mg by mouth 2 (two) times a day.   Yes Sarah Thomson MD   fexofenadine (ALLEGRA) 180 MG tablet Take 180 mg by mouth Daily.   Yes Sarah Thomson MD   Insulin Degludec (Tresiba) 100 UNIT/ML solution injection Inject 100 Units under the skin into the appropriate area as directed Daily.   Yes Sarah Thomson MD   lisinopril (PRINIVIL,ZESTRIL) 20 MG tablet Take 20 mg by mouth 2 (two) times a day.   Yes Sarah Thomson MD   metFORMIN ER (GLUCOPHAGE-XR) 500 MG 24 hr tablet Take 1,000 mg by mouth 2 (two) times a day.   Yes Sarah Thomson MD   rosuvastatin  "(CRESTOR) 10 MG tablet Take 10 mg by mouth Daily.   Yes ProviderSarah MD   Semaglutide,0.25 or 0.5MG/DOS, (OZEMPIC) 2 MG/1.5ML solution pen-injector Inject 0.5 mg under the skin into the appropriate area as directed 1 (One) Time Per Week. Sunday   Yes ProviderSarah MD   tamsulosin (FLOMAX) 0.4 MG capsule 24 hr capsule Take 1 capsule by mouth Daily.   Yes ProviderSarah MD   vitamin B-12 (cyancobalamin) 100 MCG tablet Take 50 mcg by mouth Daily.   Yes Provider, MD Sarah     Allergies:  Allergies   Allergen Reactions   • Penicillins Hives       Objective     Vital Signs: /95   Pulse 93   Temp 98.6 °F (37 °C) (Oral)   Resp 18   Ht 182.9 cm (72\")   Wt 128 kg (282 lb)   SpO2 94%   BMI 38.25 kg/m²   Physical Exam  Vitals signs and nursing note reviewed.   Constitutional:       Appearance: He is well-developed.   HENT:      Head: Normocephalic and atraumatic.   Eyes:      General: No scleral icterus.     Pupils: Pupils are equal, round, and reactive to light.   Neck:      Musculoskeletal: Normal range of motion and neck supple.      Thyroid: No thyromegaly.      Vascular: No carotid bruit or JVD.      Trachea: No tracheal deviation.   Cardiovascular:      Rate and Rhythm: Normal rate and regular rhythm.      Heart sounds: No murmur. No friction rub. No gallop.    Pulmonary:      Effort: Pulmonary effort is normal. No respiratory distress.      Breath sounds: Normal breath sounds. No wheezing or rales.   Chest:      Chest wall: No tenderness.   Abdominal:      General: Bowel sounds are normal. There is no distension.      Palpations: Abdomen is soft.      Tenderness: There is no abdominal tenderness.      Comments: Obesity.   Musculoskeletal: Normal range of motion.   Skin:     General: Skin is warm and dry.      Capillary Refill: Capillary refill takes 2 to 3 seconds.      Findings: No rash.   Neurological:      Mental Status: He is alert and oriented to person, place, and time.    "   Cranial Nerves: No cranial nerve deficit.   Psychiatric:         Mood and Affect: Mood normal.         Behavior: Behavior normal.         Thought Content: Thought content normal.         Judgment: Judgment normal.             Results Reviewed:    Lab Results (last 24 hours)     Procedure Component Value Units Date/Time    COVID PRE-OP / PRE-PROCEDURE SCREENING ORDER (NO ISOLATION) - Swab, Nasal Cavity [[id number]]  (Normal) Collected: 11/14/20 1651    Specimen: Swab from Nasal Cavity Updated: 11/14/20 1859    Narrative:      The following orders were created for panel order COVID PRE-OP / PRE-PROCEDURE SCREENING ORDER (NO ISOLATION) - Swab, Nasal Cavity.  Procedure                               Abnormality         Status                     ---------                               -----------         ------                     COVID-19,[facility] [employer] IN-HONEY...[[id number]]  Normal              Final result                 Please view results for these tests on the individual orders.    COVID-19,[facility] [employer] IN-HOUSE,Nasal Swab No Transport Media 3-4 HR TAT - Swab, Nasal Cavity [[id number]]  (Normal) Collected: 11/14/20 1651    Specimen: Swab from Nasal Cavity Updated: 11/14/20 1859     COVID19 Not Detected    Narrative:      Fact sheet for providers: https://www.fda.gov/media/679106/download     Fact sheet for patients: https://www.fda.gov/media/108413/download    Troponin [[id number]]  (Normal) Collected: 11/14/20 1807    Specimen: Blood Updated: 11/14/20 1832     Troponin T <0.010 ng/mL     Narrative:      Troponin T Reference Range:  <= 0.03 ng/mL-   Negative for AMI  >0.03 ng/mL-     Abnormal for myocardial necrosis.  Clinicians would have to utilize clinical acumen, EKG, Troponin and serial changes to determine if it is an Acute Myocardial Infarction or myocardial injury due to an underlying chronic condition.       Results may be falsely decreased if patient taking Biotin.      [place] Draw [[id number]] Collected: 11/14/20  1608    Specimen: Blood Updated: 11/14/20 1715    Narrative:      The following orders were created for panel order [place] Draw.  Procedure                               Abnormality         Status                     ---------                               -----------         ------                     Light Blue Top[[id number]]                                   Final result               Green Top (Gel)[[id number]]                                  Final result               Lavender Top[[id number]]                                     Final result               Red Top[[id number]]                                          Final result                 Please view results for these tests on the individual orders.    Light Blue Top [[id number]] Collected: 11/14/20 1608    Specimen: Blood Updated: 11/14/20 1715     Extra Tube hold for add-on     Comment: Auto resulted       Green Top (Gel) [[id number]] Collected: 11/14/20 1608    Specimen: Blood Updated: 11/14/20 1715     Extra Tube Hold for add-ons.     Comment: Auto resulted.       Lavender Top [[id number]] Collected: 11/14/20 1608    Specimen: Blood Updated: 11/14/20 1715     Extra Tube hold for add-on     Comment: Auto resulted       Red Top [[id number]] Collected: 11/14/20 1608    Specimen: Blood Updated: 11/14/20 1715     Extra Tube Hold for add-ons.     Comment: Auto resulted.       Comprehensive Metabolic Panel [[id number]]  (Abnormal) Collected: 11/14/20 1608    Specimen: Blood Updated: 11/14/20 1647     Glucose 135 mg/dL      BUN 13 mg/dL      Creatinine 0.72 mg/dL      Sodium 139 mmol/L      Potassium 3.8 mmol/L      Comment: Slight hemolysis detected by analyzer. Results may be affected.        Chloride 100 mmol/L      CO2 27.0 mmol/L      Calcium 9.1 mg/dL      Total Protein 7.0 g/dL      Albumin 4.50 g/dL      ALT (SGPT) 118 U/L      AST (SGOT) 60 U/L      Alkaline Phosphatase 72 U/L      Total Bilirubin 0.4 mg/dL      eGFR Non African Amer 113 mL/min/1.73       eGFR  African Amer 137 mL/min/1.73      Globulin 2.5 gm/dL      A/G Ratio 1.8 g/dL      BUN/Creatinine Ratio 18.1     Anion Gap 12.0 mmol/L     Narrative:      GFR Normal >60  Chronic Kidney Disease <60  Kidney Failure <15      Troponin [[id number]]  (Normal) Collected: 11/14/20 1608    Specimen: Blood Updated: 11/14/20 1643     Troponin T <0.010 ng/mL     Narrative:      Troponin T Reference Range:  <= 0.03 ng/mL-   Negative for AMI  >0.03 ng/mL-     Abnormal for myocardial necrosis.  Clinicians would have to utilize clinical acumen, EKG, Troponin and serial changes to determine if it is an Acute Myocardial Infarction or myocardial injury due to an underlying chronic condition.       Results may be falsely decreased if patient taking Biotin.      CBC & Differential [[id number]]  (Abnormal) Collected: 11/14/20 1608    Specimen: Blood Updated: 11/14/20 1626    Narrative:      The following orders were created for panel order CBC & Differential.  Procedure                               Abnormality         Status                     ---------                               -----------         ------                     CBC Auto Differential[[id number]]        Abnormal            Final result                 Please view results for these tests on the individual orders.    CBC Auto Differential [[id number]]  (Abnormal) Collected: 11/14/20 1608    Specimen: Blood Updated: 11/14/20 1626     WBC 10.17 10*3/mm3      RBC 6.28 10*6/mm3      Hemoglobin 17.9 g/dL      Hematocrit 54.1 %      MCV 86.1 fL      MCH 28.5 pg      MCHC 33.1 g/dL      RDW 15.3 %      RDW-SD 45.8 fl      MPV 10.5 fL      Platelets 183 10*3/mm3      Neutrophil % 60.2 %      Lymphocyte % 26.0 %      Monocyte % 8.7 %      Eosinophil % 3.2 %      Basophil % 1.5 %      Immature Grans % 0.4 %      Neutrophils, Absolute 6.13 10*3/mm3      Lymphocytes, Absolute 2.64 10*3/mm3      Monocytes, Absolute 0.88 10*3/mm3      Eosinophils, Absolute 0.33 10*3/mm3       Basophils, Absolute 0.15 10*3/mm3      Immature Grans, Absolute 0.04 10*3/mm3      nRBC 0.0 /100 WBC            Radiology Data:    Imaging Results (Last 24 Hours)     Procedure Component Value Units Date/Time    CT Angiogram Chest [[id number]] Collected: 11/14/20 1825     Updated: 11/14/20 1837    Narrative:      CT chest angiogram dated 11/14/2020 5:47 PM CST      HISTORY: Chest pain. Aortic dissection suspected.     COMPARISON: None.      DLP: 611 mGy cm. Automated exposure control was utilized to diminish  patient radiation dose.     TECHNIQUE: Helical tomographic images of the chest were obtained after  the administration of intravenous contrast following angiogram protocol.  Additionally, 3D MIP reconstructions in the coronal and sagittal planes  were provided.        FINDINGS:    There is nodularity within both lobes of the thyroid gland including a  1.7 cm centrally cystic nodule involving the posterior interpolar aspect  of the right lobe. If not previously evaluated follow-up with thyroid  ultrasound could be obtained. There is no evidence of adenopathy within  the supraclavicular fossa..      There is adequate enhancement of the pulmonary arteries to evaluate for  central and segmental pulmonary emboli. There are no filling defects  within the main, lobar, segmental or visualized subsegmental pulmonary  arteries. The pulmonary vessels are within normal limits.      There is a 5 mm subpleural soft tissue nodule within the posterior  segment of the right upper lobe on image 54. There is also a 5 mm  intrafissural lymph node associated with the minor fissure on image 87  of the axial sequence. Left lingular atelectasis is present. Lungs are  otherwise clear. No evidence of consolidative pneumonia or effusion..  The trachea and bronchial tree are patent.      The heart and great vessels appear unremarkable. There is no pericardial  effusion. Some linear artifact involving the anterior ascending thoracic  aorta  also projects outside of the vessel and is felt to be artifactual  in nature related to pulsation. No evidence of discrete intimal flap or  dissection. The proximal great vessels are unremarkable. No evidence of  aneurysm.     There is an 11 mm short axis node high within the right paratracheal  martin group. No additional enlarged axillary or mediastinal nodes are  present. No enlarged hilar adenopathy is present..      The osseous structures of the thorax and surrounding soft tissues  demonstrate no acute process.      There is steatosis of the liver. The adrenals are unremarkable. There is  a small hiatal hernia..        Impression:      1. No evidence of pulmonary embolus.  2. The thoracic aorta and proximal great vessels are normal in caliber.  No evidence of dissection or aneurysm.  3. There is a single enlarged high right paratracheal node measuring 11  mm in short axis. I suspect this is reactive in nature.  4. There are some enlargement of the thyroid gland with nodularity. If  not previously documented follow-up with thyroid ultrasound could be  obtained. There is a nodule involving the posterior interpolar aspect of  the right lobe measuring 1.7 cm in size..  5. Soft tissue nodule 5 mm in size which is subpleural in location  within the posterior segment of the right upper lobe. Follow-up imaging  is recommended based on Fleischner guidelines.  Fleischner Society Recommendations for Management of SOLID Pulmonary  Nodules:     1.  If a nodule is less than or equal to 4 mm in size, low risk patients  require no follow up, and high risk patients require a follow-up CT of  the chest at 12 months.  2.  If a nodule is 5-6 mm in size, low risk patients require a follow-up  CT at 12 months, and high risk patients require follow up CT scans at  6-12 months and 18-24 months.  3.  If a nodule is 7-8 mm in size, low risk patients requiring follow-up  at 6-12 months and 18-24 months, and high risk patients  requiring  follow-up at 3-6 months, 9-12 months and 24 months.  4.  If a nodule is greater than 8 mm in size, ALL patients require  follow-up at 3, 9 and 24 months. PET/CT or biopsy should also be  considered.        This report was finalized on 11/14/2020 18:34 by Dr. Vinnie Sigala MD.    XR Chest 1 View [[id number]] Collected: 11/14/20 1637     Updated: 11/14/20 1641    Narrative:      Frontal upright radiograph of the chest 11/14/2020 4:34 PM CST     HISTORY: Chest pain     COMPARISON: None.     FINDINGS:      No lung consolidation. No pleural effusion or pneumothorax. The  cardiomediastinal silhouette and pulmonary vascularity are within normal  limits. The osseous structures and surrounding soft tissues demonstrate  no acute abnormality. Chronic right lateral rib fractures.       Impression:      1. No radiographic evidence of acute cardiopulmonary process.        This report was finalized on 11/14/2020 16:38 by Dr Florian Betancourt, .          I have personally reviewed and interpreted the radiology studies and ECG obtained at time of admission.     Assessment / Plan      Assessment & Plan  Active Hospital Problems    Diagnosis   • Accelerated hypertension   • Chest pain, atypical   • DM (diabetes mellitus) (CMS/[facility])     Plans    Hypertension crisis/chest pain/hyperlipidemia/hypertension.  Troponin x2 is negative.  Stress test in a.m. Echocardiogram.  Continue lisinopril.  Labetalol as needed.  Hydralazine as needed.  Continue Crestor. Start norvasc. Nitro prn.   Chest pain-No radiographic evidence of acute cardiopulmonary process.    CTA of the chest-  No evidence of pulmonary embolus, thoracic aorta and proximal great vessels are normal in caliber.  no evidence of dissection or aneurysm, single enlarged high right paratracheal node measuring 11 mm in short axis- reactive in nature, some enlargement of the thyroid gland with nodularity- follow-up with thyroid ultrasound,nodule involving the posterior  interpolar aspect of the right lobe measuring 1.7 cm in size, soft tissue nodule 5 mm in size which is subpleural in location within the posterior segment of the right upper lobe-follow-up imaging is recommended-nodule is greater than 8 mm in size, ALL patients require follow-up at 3, 9 and 24 months.     Lung nodule.  Recommendation discussed with patient.  Patient will need to follow-up with PCP for CT scan in about 3 months.    Diabetes.  Sliding scale.  Hold Metformin.  Start Levemir.    Polycythemia.  IV hydration.    Nausea.  Pepcid.  Zofran as needed.    Chronic pain.  Hold diclofenac.    Anxiety.  Continue BuSpar.    Covid-19-negative.    Obesity.  BMI 38.  Diet exercise discussed with patient.    Code Status: Full code.     I discussed the patient's findings and my recommendations with: Patient.    Estimated length of stay: 2 to 4 days.    Electronically signed by Stevie Millan MD, 11/14/20, 6:40 PM CST.

## 2020-11-15 NOTE — PROGRESS NOTES
[facility] Medicine Services  INPATIENT PROGRESS NOTE    Length of Stay: 0  Date of Admission: 11/14/2020  Primary Care Physician: Aurelia Devi APRN    Subjective   Chief Complaint: Chest pain.  Hypertension.    HPI   Patient blood pressure is better.  Added Norvasc this morning.  Plan to add Toprol-XL now.  Patient unable to tolerate the stress test due to hypotension.  Ventricle better control the blood pressure, repeat with Lexiscan tomorrow.  If negative possibly going home tomorrow discussed with patient.  Patient currently denies any chest pain.    Review of Systems   Constitutional: Positive for activity change, appetite change and fatigue. Negative for chills and fever.   HENT: Negative for hearing loss, nosebleeds, tinnitus and trouble swallowing.    Eyes: Negative for visual disturbance.   Respiratory: Negative for cough, chest tightness, shortness of breath and wheezing.    Cardiovascular: Negative for chest pain, palpitations and leg swelling.   Gastrointestinal: Negative for abdominal distention, abdominal pain, blood in stool, constipation, diarrhea, nausea and vomiting.   Endocrine: Negative for cold intolerance, heat intolerance, polydipsia, polyphagia and polyuria.   Genitourinary: Negative for decreased urine volume, difficulty urinating, dysuria, flank pain, frequency and hematuria.   Musculoskeletal: Negative for arthralgias, joint swelling and myalgias.   Skin: Negative for rash.   Allergic/Immunologic: Negative for immunocompromised state.   Neurological: Positive for weakness. Negative for dizziness, syncope, light-headedness and headaches.   Hematological: Negative for adenopathy. Does not bruise/bleed easily.   Psychiatric/Behavioral: Negative for confusion and sleep disturbance. The patient is not nervous/anxious.      All pertinent negatives and positives are as above. All other systems have been reviewed and are negative unless otherwise stated.      Objective    Temp:  [97.8 °F (36.6 °C)-98.6 °F (37 °C)] 98 °F (36.7 °C)  Heart Rate:  [73-98] 82  Resp:  [14-24] 20  BP: (131-178)/() 150/90    Intake/Output Summary (Last 24 hours) at 11/15/2020 1656  Last data filed at 11/15/2020 1300  Gross per 24 hour   Intake 240 ml   Output 1300 ml   Net -1060 ml     Physical Exam  Vitals signs and nursing note reviewed.   Constitutional:       Appearance: He is well-developed.   HENT:      Head: Normocephalic and atraumatic.   Eyes:      General: No scleral icterus.     Pupils: Pupils are equal, round, and reactive to light.   Neck:      Musculoskeletal: Normal range of motion and neck supple.      Thyroid: No thyromegaly.      Vascular: No carotid bruit or JVD.      Trachea: No tracheal deviation.   Cardiovascular:      Rate and Rhythm: Normal rate and regular rhythm.      Heart sounds: No murmur. No friction rub. No gallop.    Pulmonary:      Effort: Pulmonary effort is normal. No respiratory distress.      Breath sounds: Normal breath sounds. No wheezing or rales.   Chest:      Chest wall: No tenderness.   Abdominal:      General: Bowel sounds are normal. There is no distension.      Palpations: Abdomen is soft.      Tenderness: There is no abdominal tenderness.      Comments: Obesity.   Musculoskeletal: Normal range of motion.   Skin:     General: Skin is warm and dry.      Capillary Refill: Capillary refill takes 2 to 3 seconds.      Findings: No rash.   Neurological:      Mental Status: He is alert and oriented to person, place, and time.      Cranial Nerves: No cranial nerve deficit.   Psychiatric:         Mood and Affect: Mood normal.         Behavior: Behavior normal.         Thought Content: Thought content normal.         Judgment: Judgment normal.   Results Review:  Lab Results (last 24 hours)     Procedure Component Value Units Date/Time    POC Glucose Once [[id number]]  (Abnormal) Collected: 11/15/20 1140    Specimen: Blood Updated: 11/15/20 1152      Glucose 131 mg/dL      Comment: : [id number] Nalini AnnMeter ID: [id number]       Lipid Panel [[id number]]  (Abnormal) Collected: 11/15/20 0809    Specimen: Blood Updated: 11/15/20 1105     Total Cholesterol 115 mg/dL      Triglycerides 110 mg/dL      HDL Cholesterol 26 mg/dL      LDL Cholesterol  68 mg/dL      VLDL Cholesterol 21 mg/dL      LDL/HDL Ratio 2.58    Narrative:      Cholesterol Reference Ranges  (U.S. Department of Health and Human Services ATP III Classifications)    Desirable          <200 mg/dL  Borderline High    200-239 mg/dL  High Risk          >240 mg/dL      Triglyceride Reference Ranges  (U.S. Department of Health and Human Services ATP III Classifications)    Normal           <150 mg/dL  Borderline High  150-199 mg/dL  High             200-499 mg/dL  Very High        >500 mg/dL    HDL Reference Ranges  (U.S. Department of Health and Human Services ATP III Classifcations)    Low     <40 mg/dl (major risk factor for CHD)  High    >60 mg/dl ('negative' risk factor for CHD)        LDL Reference Ranges  (U.S. Department of Health and Human Services ATP III Classifcations)    Optimal          <100 mg/dL  Near Optimal     100-129 mg/dL  Borderline High  130-159 mg/dL  High             160-189 mg/dL  Very High        >189 mg/dL    Hemoglobin A1c [[id number]]  (Abnormal) Collected: 11/15/20 0809    Specimen: Blood Updated: 11/15/20 1036     Hemoglobin A1C 7.60 %     Narrative:      Hemoglobin A1C Ranges:    Increased Risk for Diabetes  5.7% to 6.4%  Diabetes                     >= 6.5%  Diabetic Goal                < 7.0%    Comprehensive Metabolic Panel [[id number]]  (Abnormal) Collected: 11/15/20 0809    Specimen: Blood Updated: 11/15/20 0854     Glucose 149 mg/dL      BUN 14 mg/dL      Creatinine 0.70 mg/dL      Sodium 141 mmol/L      Potassium 3.9 mmol/L      Chloride 103 mmol/L      CO2 29.0 mmol/L      Calcium 8.6 mg/dL      Total Protein 6.3 g/dL      Albumin 4.10 g/dL      ALT (SGPT) 110 U/L       AST (SGOT) 60 U/L      Alkaline Phosphatase 54 U/L      Total Bilirubin 0.4 mg/dL      eGFR Non African Amer 117 mL/min/1.73      Globulin 2.2 gm/dL      A/G Ratio 1.9 g/dL      BUN/Creatinine Ratio 20.0     Anion Gap 9.0 mmol/L     Narrative:      GFR Normal >60  Chronic Kidney Disease <60  Kidney Failure <15      TSH [[id number]]  (Abnormal) Collected: 11/15/20 0809    Specimen: Blood Updated: 11/15/20 0854     TSH 4.630 uIU/mL     CBC & Differential [[id number]]  (Abnormal) Collected: 11/15/20 0809    Specimen: Blood Updated: 11/15/20 0827    Narrative:      The following orders were created for panel order CBC & Differential.  Procedure                               Abnormality         Status                     ---------                               -----------         ------                     CBC Auto Differential[[id number]]        Abnormal            Final result                 Please view results for these tests on the individual orders.    CBC Auto Differential [[id number]]  (Abnormal) Collected: 11/15/20 0809    Specimen: Blood Updated: 11/15/20 0827     WBC 9.15 10*3/mm3      RBC 6.09 10*6/mm3      Hemoglobin 17.3 g/dL      Hematocrit 53.6 %      MCV 88.0 fL      MCH 28.4 pg      MCHC 32.3 g/dL      RDW 15.4 %      RDW-SD 48.7 fl      MPV 10.6 fL      Platelets 162 10*3/mm3      Neutrophil % 59.9 %      Lymphocyte % 24.9 %      Monocyte % 9.8 %      Eosinophil % 3.7 %      Basophil % 1.2 %      Immature Grans % 0.5 %      Neutrophils, Absolute 5.47 10*3/mm3      Lymphocytes, Absolute 2.28 10*3/mm3      Monocytes, Absolute 0.90 10*3/mm3      Eosinophils, Absolute 0.34 10*3/mm3      Basophils, Absolute 0.11 10*3/mm3      Immature Grans, Absolute 0.05 10*3/mm3      nRBC 0.0 /100 WBC     POC Glucose Once [[id number]]  (Normal) Collected: 11/15/20 0800    Specimen: Blood Updated: 11/15/20 0812     Glucose 125 mg/dL      Comment: : [id number] Nalini AnnMeter ID: [id number]       COVID PRE-OP /  PRE-PROCEDURE SCREENING ORDER (NO ISOLATION) - Swab, Nasal Cavity [[id number]]  (Normal) Collected: 11/14/20 1651    Specimen: Swab from Nasal Cavity Updated: 11/14/20 1859    Narrative:      The following orders were created for panel order COVID PRE-OP / PRE-PROCEDURE SCREENING ORDER (NO ISOLATION) - Swab, Nasal Cavity.  Procedure                               Abnormality         Status                     ---------                               -----------         ------                     COVID-19,[facility] [employer] IN-HONEY...[[id number]]  Normal              Final result                 Please view results for these tests on the individual orders.    COVID-19,[facility] [employer] IN-HOUSE,Nasal Swab No Transport Media 3-4 HR TAT - Swab, Nasal Cavity [[id number]]  (Normal) Collected: 11/14/20 1651    Specimen: Swab from Nasal Cavity Updated: 11/14/20 1859     COVID19 Not Detected    Narrative:      Fact sheet for providers: https://www.fda.gov/media/183521/download     Fact sheet for patients: https://www.fda.gov/media/110839/download    Troponin [[id number]]  (Normal) Collected: 11/14/20 1807    Specimen: Blood Updated: 11/14/20 1832     Troponin T <0.010 ng/mL     Narrative:      Troponin T Reference Range:  <= 0.03 ng/mL-   Negative for AMI  >0.03 ng/mL-     Abnormal for myocardial necrosis.  Clinicians would have to utilize clinical acumen, EKG, Troponin and serial changes to determine if it is an Acute Myocardial Infarction or myocardial injury due to an underlying chronic condition.       Results may be falsely decreased if patient taking Biotin.      Lee Draw [[id number]] Collected: 11/14/20 1608    Specimen: Blood Updated: 11/14/20 1715    Narrative:      The following orders were created for panel order Lee Draw.  Procedure                               Abnormality         Status                     ---------                               -----------         ------                     Light Blue Top[[id number]]                                    Final result               Green Top (Gel)[[id number]]                                  Final result               Lavender Top[[id number]]                                     Final result               Red Top[[id number]]                                          Final result                 Please view results for these tests on the individual orders.    Light Blue Top [[id number]] Collected: 11/14/20 1608    Specimen: Blood Updated: 11/14/20 1715     Extra Tube hold for add-on     Comment: Auto resulted       Green Top (Gel) [[id number]] Collected: 11/14/20 1608    Specimen: Blood Updated: 11/14/20 1715     Extra Tube Hold for add-ons.     Comment: Auto resulted.       Lavender Top [[id number]] Collected: 11/14/20 1608    Specimen: Blood Updated: 11/14/20 1715     Extra Tube hold for add-on     Comment: Auto resulted       Red Top [[id number]] Collected: 11/14/20 1608    Specimen: Blood Updated: 11/14/20 1715     Extra Tube Hold for add-ons.     Comment: Auto resulted.              Cultures:  No results found for: BLOODCX, URINECX, WOUNDCX, MRSACX, RESPCX, STOOLCX    Radiology Data:    Imaging Results (Last 24 Hours)     Procedure Component Value Units Date/Time    CT Angiogram Chest [[id number]] Collected: 11/14/20 1825     Updated: 11/14/20 1837    Narrative:      CT chest angiogram dated 11/14/2020 5:47 PM CST      HISTORY: Chest pain. Aortic dissection suspected.     COMPARISON: None.      DLP: 611 mGy cm. Automated exposure control was utilized to diminish  patient radiation dose.     TECHNIQUE: Helical tomographic images of the chest were obtained after  the administration of intravenous contrast following angiogram protocol.  Additionally, 3D MIP reconstructions in the coronal and sagittal planes  were provided.        FINDINGS:    There is nodularity within both lobes of the thyroid gland including a  1.7 cm centrally cystic nodule involving the posterior interpolar aspect  of the right lobe.  If not previously evaluated follow-up with thyroid  ultrasound could be obtained. There is no evidence of adenopathy within  the supraclavicular fossa..      There is adequate enhancement of the pulmonary arteries to evaluate for  central and segmental pulmonary emboli. There are no filling defects  within the main, lobar, segmental or visualized subsegmental pulmonary  arteries. The pulmonary vessels are within normal limits.      There is a 5 mm subpleural soft tissue nodule within the posterior  segment of the right upper lobe on image 54. There is also a 5 mm  intrafissural lymph node associated with the minor fissure on image 87  of the axial sequence. Left lingular atelectasis is present. Lungs are  otherwise clear. No evidence of consolidative pneumonia or effusion..  The trachea and bronchial tree are patent.      The heart and great vessels appear unremarkable. There is no pericardial  effusion. Some linear artifact involving the anterior ascending thoracic  aorta also projects outside of the vessel and is felt to be artifactual  in nature related to pulsation. No evidence of discrete intimal flap or  dissection. The proximal great vessels are unremarkable. No evidence of  aneurysm.     There is an 11 mm short axis node high within the right paratracheal  martin group. No additional enlarged axillary or mediastinal nodes are  present. No enlarged hilar adenopathy is present..      The osseous structures of the thorax and surrounding soft tissues  demonstrate no acute process.      There is steatosis of the liver. The adrenals are unremarkable. There is  a small hiatal hernia..        Impression:      1. No evidence of pulmonary embolus.  2. The thoracic aorta and proximal great vessels are normal in caliber.  No evidence of dissection or aneurysm.  3. There is a single enlarged high right paratracheal node measuring 11  mm in short axis. I suspect this is reactive in nature.  4. There are some enlargement of  the thyroid gland with nodularity. If  not previously documented follow-up with thyroid ultrasound could be  obtained. There is a nodule involving the posterior interpolar aspect of  the right lobe measuring 1.7 cm in size..  5. Soft tissue nodule 5 mm in size which is subpleural in location  within the posterior segment of the right upper lobe. Follow-up imaging  is recommended based on Fleischner guidelines.  Fleischner Society Recommendations for Management of SOLID Pulmonary  Nodules:     1.  If a nodule is less than or equal to 4 mm in size, low risk patients  require no follow up, and high risk patients require a follow-up CT of  the chest at 12 months.  2.  If a nodule is 5-6 mm in size, low risk patients require a follow-up  CT at 12 months, and high risk patients require follow up CT scans at  6-12 months and 18-24 months.  3.  If a nodule is 7-8 mm in size, low risk patients requiring follow-up  at 6-12 months and 18-24 months, and high risk patients requiring  follow-up at 3-6 months, 9-12 months and 24 months.  4.  If a nodule is greater than 8 mm in size, ALL patients require  follow-up at 3, 9 and 24 months. PET/CT or biopsy should also be  considered.        This report was finalized on 11/14/2020 18:34 by Dr. Vinnie Sigala MD.          Allergies   Allergen Reactions   • Penicillins Hives       Scheduled meds:   amLODIPine, 5 mg, Oral, Q24H  aspirin, 81 mg, Oral, Daily  busPIRone, 15 mg, Oral, BID  famotidine, 20 mg, Oral, BID  insulin lispro, 0-9 Units, Subcutaneous, TID AC  lisinopril, 20 mg, Oral, BID  Morphine, 4 mg, Intravenous, Once  ondansetron, 4 mg, Intravenous, Once  rosuvastatin, 10 mg, Oral, Daily  tamsulosin, 0.4 mg, Oral, Daily        PRN meds:  •  acetaminophen  •  dextrose  •  dextrose  •  glucagon (human recombinant)  •  hydrALAZINE  •  labetalol  •  nitroglycerin  •  ondansetron  •  sodium chloride    Assessment/Plan       Accelerated hypertension    Chest pain, atypical    DM  (diabetes mellitus) (CMS/[facility])      Plan:  Hypertension crisis/chest pain/hyperlipidemia/hypertension.  Troponin x2 is negative.    Unable to do a stress echo today due to patient extremely elevated blood pressure. Continue lisinopril.  Labetalol as needed.  Hydralazine as needed.  Continue Crestor. Start norvasc.  Start Toprol-XL.  Nitro prn.   Plan to control the blood pressure better and get Lexiscan tomorrow.  Chest x-ray -No radiographic evidence of acute cardiopulmonary process.    CTA of the chest-  No evidence of pulmonary embolus, thoracic aorta and proximal great vessels are normal in caliber.  no evidence of dissection or aneurysm, single enlarged high right paratracheal node measuring 11 mm in short axis- reactive in nature, some enlargement of the thyroid gland with nodularity- follow-up with thyroid ultrasound,nodule involving the posterior interpolar aspect of the right lobe measuring 1.7 cm in size, soft tissue nodule 5 mm in size which is subpleural in location within the posterior segment of the right upper lobe-follow-up imaging is recommended-nodule is greater than 8 mm in size, ALL patients require follow-up at 3, 9 and 24 months.   Echocardiogram-ejection fraction 66 to 70%, moderate concentric hypertrophy, tricuspid regurgitation.     Lung nodule.  Recommendation discussed with patient.  Patient will need to follow-up with PCP for nodule is greater than 8 mm in size, ALL patients require follow-up at 3, 9 and 24 months. PET/CT or biopsy should also be considered.    Thyroid nodule.  Discussed with patient.  Follow with primary care doctor.     Diabetes.  Sliding scale.  Hold Metformin.  Continue Levemir.  Hemoglobin A1c 7.6     Polycythemia.  IV hydration.     Nausea.  Pepcid.  Zofran as needed.     Chronic pain.  Hold diclofenac.     Anxiety.  Continue BuSpar.     Covid-19-negative.     Obesity.  BMI 38.  Diet exercise discussed with patient.    Discharge Plannin to 2  days.    Electronically signed by Stevie Millan MD, 11/15/20, 4:56 PM CST.

## 2020-11-16 ENCOUNTER — APPOINTMENT (OUTPATIENT)
Dept: CARDIOLOGY | Facility: HOSPITAL | Age: 55
End: 2020-11-16

## 2020-11-16 LAB
ALBUMIN SERPL-MCNC: 3.9 G/DL (ref 3.5–5.2)
ALBUMIN/GLOB SERPL: 1.9 G/DL
ALP SERPL-CCNC: 44 U/L (ref 39–117)
ALT SERPL W P-5'-P-CCNC: 111 U/L (ref 1–41)
ANION GAP SERPL CALCULATED.3IONS-SCNC: 10 MMOL/L (ref 5–15)
AST SERPL-CCNC: 60 U/L (ref 1–40)
BASOPHILS # BLD AUTO: 0.11 10*3/MM3 (ref 0–0.2)
BASOPHILS NFR BLD AUTO: 1.2 % (ref 0–1.5)
BH CV STRESS BP STAGE 1: NORMAL
BH CV STRESS COMMENTS STAGE 1: NORMAL
BH CV STRESS DOSE REGADENOSON STAGE 1: 0.4
BH CV STRESS DURATION MIN STAGE 1: 0
BH CV STRESS DURATION SEC STAGE 1: 10
BH CV STRESS HR STAGE 1: 99
BH CV STRESS PROTOCOL 1: NORMAL
BH CV STRESS RECOVERY BP: NORMAL MMHG
BH CV STRESS RECOVERY HR: 90 BPM
BH CV STRESS STAGE 1: 1
BILIRUB SERPL-MCNC: 0.6 MG/DL (ref 0–1.2)
BUN SERPL-MCNC: 10 MG/DL (ref 6–20)
BUN/CREAT SERPL: 15.4 (ref 7–25)
CALCIUM SPEC-SCNC: 8.4 MG/DL (ref 8.6–10.5)
CHLORIDE SERPL-SCNC: 105 MMOL/L (ref 98–107)
CO2 SERPL-SCNC: 23 MMOL/L (ref 22–29)
CREAT SERPL-MCNC: 0.65 MG/DL (ref 0.76–1.27)
DEPRECATED RDW RBC AUTO: 48.7 FL (ref 37–54)
EOSINOPHIL # BLD AUTO: 0.41 10*3/MM3 (ref 0–0.4)
EOSINOPHIL NFR BLD AUTO: 4.5 % (ref 0.3–6.2)
ERYTHROCYTE [DISTWIDTH] IN BLOOD BY AUTOMATED COUNT: 16 % (ref 12.3–15.4)
GFR SERPL CREATININE-BSD FRML MDRD: 127 ML/MIN/1.73
GLOBULIN UR ELPH-MCNC: 2.1 GM/DL
GLUCOSE BLDC GLUCOMTR-MCNC: 119 MG/DL (ref 70–130)
GLUCOSE BLDC GLUCOMTR-MCNC: 126 MG/DL (ref 70–130)
GLUCOSE BLDC GLUCOMTR-MCNC: 128 MG/DL (ref 70–130)
GLUCOSE BLDC GLUCOMTR-MCNC: 200 MG/DL (ref 70–130)
GLUCOSE SERPL-MCNC: 142 MG/DL (ref 65–99)
HCT VFR BLD AUTO: 52.3 % (ref 37.5–51)
HGB BLD-MCNC: 17.3 G/DL (ref 13–17.7)
IMM GRANULOCYTES # BLD AUTO: 0.03 10*3/MM3 (ref 0–0.05)
IMM GRANULOCYTES NFR BLD AUTO: 0.3 % (ref 0–0.5)
LV EF NUC BP: 58 %
LYMPHOCYTES # BLD AUTO: 2.57 10*3/MM3 (ref 0.7–3.1)
LYMPHOCYTES NFR BLD AUTO: 28.2 % (ref 19.6–45.3)
MAXIMAL PREDICTED HEART RATE: 164 BPM
MCH RBC QN AUTO: 29 PG (ref 26.6–33)
MCHC RBC AUTO-ENTMCNC: 33.1 G/DL (ref 31.5–35.7)
MCV RBC AUTO: 87.6 FL (ref 79–97)
MONOCYTES # BLD AUTO: 0.86 10*3/MM3 (ref 0.1–0.9)
MONOCYTES NFR BLD AUTO: 9.5 % (ref 5–12)
NEUTROPHILS NFR BLD AUTO: 5.12 10*3/MM3 (ref 1.7–7)
NEUTROPHILS NFR BLD AUTO: 56.3 % (ref 42.7–76)
NRBC BLD AUTO-RTO: 0 /100 WBC (ref 0–0.2)
PERCENT MAX PREDICTED HR: 60.37 %
PLATELET # BLD AUTO: 176 10*3/MM3 (ref 140–450)
PMV BLD AUTO: 11.1 FL (ref 6–12)
POTASSIUM SERPL-SCNC: 4.2 MMOL/L (ref 3.5–5.2)
PROT SERPL-MCNC: 6 G/DL (ref 6–8.5)
RBC # BLD AUTO: 5.97 10*6/MM3 (ref 4.14–5.8)
SODIUM SERPL-SCNC: 138 MMOL/L (ref 136–145)
STRESS BASELINE BP: NORMAL MMHG
STRESS BASELINE HR: 78 BPM
STRESS PERCENT HR: 71 %
STRESS POST EXERCISE DUR SEC: 10 SEC
STRESS POST PEAK BP: NORMAL MMHG
STRESS POST PEAK HR: 99 BPM
STRESS TARGET HR: 139 BPM
WBC # BLD AUTO: 9.1 10*3/MM3 (ref 3.4–10.8)

## 2020-11-16 PROCEDURE — 85025 COMPLETE CBC W/AUTO DIFF WBC: CPT | Performed by: FAMILY MEDICINE

## 2020-11-16 PROCEDURE — 0 TECHNETIUM SESTAMIBI: Performed by: FAMILY MEDICINE

## 2020-11-16 PROCEDURE — 82962 GLUCOSE BLOOD TEST: CPT

## 2020-11-16 PROCEDURE — 93018 CV STRESS TEST I&R ONLY: CPT | Performed by: INTERNAL MEDICINE

## 2020-11-16 PROCEDURE — G0378 HOSPITAL OBSERVATION PER HR: HCPCS

## 2020-11-16 PROCEDURE — 80053 COMPREHEN METABOLIC PANEL: CPT | Performed by: FAMILY MEDICINE

## 2020-11-16 PROCEDURE — 93017 CV STRESS TEST TRACING ONLY: CPT

## 2020-11-16 PROCEDURE — A9500 TC99M SESTAMIBI: HCPCS | Performed by: FAMILY MEDICINE

## 2020-11-16 PROCEDURE — 96361 HYDRATE IV INFUSION ADD-ON: CPT

## 2020-11-16 PROCEDURE — 25010000002 REGADENOSON 0.4 MG/5ML SOLUTION: Performed by: INTERNAL MEDICINE

## 2020-11-16 PROCEDURE — 78452 HT MUSCLE IMAGE SPECT MULT: CPT | Performed by: INTERNAL MEDICINE

## 2020-11-16 PROCEDURE — 78452 HT MUSCLE IMAGE SPECT MULT: CPT

## 2020-11-16 PROCEDURE — 93016 CV STRESS TEST SUPVJ ONLY: CPT | Performed by: INTERNAL MEDICINE

## 2020-11-16 RX ORDER — BUTALBITAL, ACETAMINOPHEN AND CAFFEINE 50; 325; 40 MG/1; MG/1; MG/1
1 TABLET ORAL EVERY 4 HOURS PRN
Status: DISCONTINUED | OUTPATIENT
Start: 2020-11-16 | End: 2020-11-17 | Stop reason: HOSPADM

## 2020-11-16 RX ADMIN — TECHNETIUM TC 99M SESTAMIBI 1 DOSE: 1 INJECTION INTRAVENOUS at 12:20

## 2020-11-16 RX ADMIN — BUTALBITAL, ACETAMINOPHEN, AND CAFFEINE 1 TABLET: 50; 325; 40 TABLET ORAL at 14:53

## 2020-11-16 RX ADMIN — BUSPIRONE HYDROCHLORIDE 15 MG: 5 TABLET ORAL at 20:03

## 2020-11-16 RX ADMIN — TECHNETIUM TC 99M SESTAMIBI 1 DOSE: 1 INJECTION INTRAVENOUS at 13:10

## 2020-11-16 RX ADMIN — METOPROLOL SUCCINATE 50 MG: 50 TABLET, FILM COATED, EXTENDED RELEASE ORAL at 08:55

## 2020-11-16 RX ADMIN — ACETAMINOPHEN 650 MG: 325 TABLET, FILM COATED ORAL at 07:40

## 2020-11-16 RX ADMIN — ROSUVASTATIN CALCIUM 10 MG: 10 TABLET, FILM COATED ORAL at 08:55

## 2020-11-16 RX ADMIN — BUTALBITAL, ACETAMINOPHEN, AND CAFFEINE 1 TABLET: 50; 325; 40 TABLET ORAL at 09:04

## 2020-11-16 RX ADMIN — FAMOTIDINE 20 MG: 20 TABLET, FILM COATED ORAL at 20:03

## 2020-11-16 RX ADMIN — ASPIRIN 81 MG 81 MG: 81 TABLET ORAL at 08:55

## 2020-11-16 RX ADMIN — FAMOTIDINE 20 MG: 20 TABLET, FILM COATED ORAL at 08:55

## 2020-11-16 RX ADMIN — TAMSULOSIN HYDROCHLORIDE 0.4 MG: 0.4 CAPSULE ORAL at 08:55

## 2020-11-16 RX ADMIN — REGADENOSON 0.4 MG: 0.08 INJECTION, SOLUTION INTRAVENOUS at 13:08

## 2020-11-16 RX ADMIN — LISINOPRIL 20 MG: 20 TABLET ORAL at 20:03

## 2020-11-16 RX ADMIN — AMLODIPINE BESYLATE 5 MG: 5 TABLET ORAL at 08:55

## 2020-11-16 RX ADMIN — LISINOPRIL 20 MG: 20 TABLET ORAL at 08:55

## 2020-11-16 RX ADMIN — BUSPIRONE HYDROCHLORIDE 15 MG: 5 TABLET ORAL at 08:55

## 2020-11-16 NOTE — PLAN OF CARE
Goal Outcome Evaluation:  Plan of Care Reviewed With: patient  Progress: no change  Outcome Summary: LEXISCAN DONE TODAY. BP STABLE, LAST READING 155/94. PATIENT CONTINUES TO C/O MILD HEADACHE, FIOROCET GIVEN TODAY. SATS WNL ON RA. CONTINUE TO MONITOR.

## 2020-11-16 NOTE — PLAN OF CARE
Goal Outcome Evaluation:  Plan of Care Reviewed With: patient  Progress: no change  Outcome Summary: VSS, no prn bp medication given, c/o of head ache of 1 PRN Tylenol given x1, abdomen tight and extended, NPO since MN for Karen, no c/o CP, Sinus 61-86 on tele.

## 2020-11-17 VITALS
SYSTOLIC BLOOD PRESSURE: 152 MMHG | TEMPERATURE: 97.6 F | DIASTOLIC BLOOD PRESSURE: 84 MMHG | HEART RATE: 67 BPM | WEIGHT: 285.6 LBS | HEIGHT: 72 IN | OXYGEN SATURATION: 94 % | RESPIRATION RATE: 16 BRPM | BODY MASS INDEX: 38.68 KG/M2

## 2020-11-17 PROBLEM — R91.1 LUNG NODULE: Status: ACTIVE | Noted: 2020-11-17

## 2020-11-17 PROBLEM — E66.9 OBESITY (BMI 30-39.9): Status: ACTIVE | Noted: 2020-11-17

## 2020-11-17 PROBLEM — E04.1 THYROID NODULE: Status: ACTIVE | Noted: 2020-11-17

## 2020-11-17 LAB — GLUCOSE BLDC GLUCOMTR-MCNC: 134 MG/DL (ref 70–130)

## 2020-11-17 PROCEDURE — 82962 GLUCOSE BLOOD TEST: CPT

## 2020-11-17 PROCEDURE — G0378 HOSPITAL OBSERVATION PER HR: HCPCS

## 2020-11-17 RX ORDER — METOPROLOL SUCCINATE 50 MG/1
50 TABLET, EXTENDED RELEASE ORAL
Qty: 30 TABLET | Refills: 3 | Status: SHIPPED | OUTPATIENT
Start: 2020-11-18

## 2020-11-17 RX ORDER — AMLODIPINE BESYLATE 5 MG/1
5 TABLET ORAL
Qty: 30 TABLET | Refills: 3 | Status: SHIPPED | OUTPATIENT
Start: 2020-11-18

## 2020-11-17 RX ADMIN — FAMOTIDINE 20 MG: 20 TABLET, FILM COATED ORAL at 08:43

## 2020-11-17 RX ADMIN — ASPIRIN 81 MG 81 MG: 81 TABLET ORAL at 08:43

## 2020-11-17 RX ADMIN — BUSPIRONE HYDROCHLORIDE 15 MG: 5 TABLET ORAL at 08:43

## 2020-11-17 RX ADMIN — ROSUVASTATIN CALCIUM 10 MG: 10 TABLET, FILM COATED ORAL at 08:43

## 2020-11-17 RX ADMIN — TAMSULOSIN HYDROCHLORIDE 0.4 MG: 0.4 CAPSULE ORAL at 08:43

## 2020-11-17 RX ADMIN — LISINOPRIL 20 MG: 20 TABLET ORAL at 08:43

## 2020-11-17 RX ADMIN — AMLODIPINE BESYLATE 5 MG: 5 TABLET ORAL at 08:43

## 2020-11-17 RX ADMIN — METOPROLOL SUCCINATE 50 MG: 50 TABLET, FILM COATED, EXTENDED RELEASE ORAL at 08:43

## 2020-11-17 NOTE — DISCHARGE SUMMARY
[facility] Medicine Services  DISCHARGE SUMMARY       Date of Admission: 11/14/2020  Date of Discharge:  11/17/2020  Primary Care Physician: Aurelia Devi APRN    Presenting Problem/History of Present Illness:  Chest pain    Final Discharge Diagnoses:  Active Hospital Problems    Diagnosis   • Thyroid nodule   • Nodule of upper lobe of right lung   • Obesity (BMI 30-39.9)   • Accelerated hypertension   • Chest pain, atypical- secondary to hypertension   • DM (diabetes mellitus) (CMS/[facility])     Consults: None    Procedures Performed:   11/16/2020-myocardial perfusion stress test  Interpretation Summary    · Impressions are consistent with a low risk study.  · Myocardial perfusion imaging indicates a normal myocardial perfusion study with no evidence of ischemia.  · Left ventricular ejection fraction is normal. (Calculated EF = 58%).  · Findings consistent with a normal ECG stress test.  · There is no prior study available for comparison.     Pertinent Test Results:   Lab Results (all)     Procedure Component Value Units Date/Time    POC Glucose Once [[id number]]  (Abnormal) Collected: 11/17/20 0746    Specimen: Blood Updated: 11/17/20 0757     Glucose 134 mg/dL      Comment: : [id number] Crespo AddisonMeter ID: [id number]       POC Glucose Once [[id number]]  (Abnormal) Collected: 11/16/20 2001    Specimen: Blood Updated: 11/16/20 2011     Glucose 200 mg/dL      Comment: : [id number] Roslyn NdiayeiaMeter ID: [id number]       POC Glucose Once [[id number]]  (Normal) Collected: 11/16/20 1551    Specimen: Blood Updated: 11/16/20 1654     Glucose 119 mg/dL      Comment: : [id number] Cara MarionyMeter ID: [id number]       POC Glucose Once [[id number]]  (Normal) Collected: 11/16/20 1132    Specimen: Blood Updated: 11/16/20 1153     Glucose 128 mg/dL      Comment: : [id number] Cara SurplexyMeter ID: [id number]       CBC Auto Differential [[id number]]  (Abnormal) Collected: 11/16/20  0924    Specimen: Blood Updated: 11/16/20 1003     WBC 9.10 10*3/mm3      RBC 5.97 10*6/mm3      Hemoglobin 17.3 g/dL      Hematocrit 52.3 %      MCV 87.6 fL      MCH 29.0 pg      MCHC 33.1 g/dL      RDW 16.0 %      RDW-SD 48.7 fl      MPV 11.1 fL      Platelets 176 10*3/mm3      Neutrophil % 56.3 %      Lymphocyte % 28.2 %      Monocyte % 9.5 %      Eosinophil % 4.5 %      Basophil % 1.2 %      Immature Grans % 0.3 %      Neutrophils, Absolute 5.12 10*3/mm3      Lymphocytes, Absolute 2.57 10*3/mm3      Monocytes, Absolute 0.86 10*3/mm3      Eosinophils, Absolute 0.41 10*3/mm3      Basophils, Absolute 0.11 10*3/mm3      Immature Grans, Absolute 0.03 10*3/mm3      nRBC 0.0 /100 WBC     POC Glucose Once [[id number]]  (Normal) Collected: 11/16/20 0822    Specimen: Blood Updated: 11/16/20 0836     Glucose 126 mg/dL      Comment: : [id number] Cara MarionyMeter ID: [id number]       Comprehensive Metabolic Panel [[id number]]  (Abnormal) Collected: 11/16/20 0742    Specimen: Blood Updated: 11/16/20 0819     Glucose 142 mg/dL      BUN 10 mg/dL      Creatinine 0.65 mg/dL      Sodium 138 mmol/L      Potassium 4.2 mmol/L      Chloride 105 mmol/L      CO2 23.0 mmol/L      Calcium 8.4 mg/dL      Total Protein 6.0 g/dL      Albumin 3.90 g/dL      ALT (SGPT) 111 U/L      AST (SGOT) 60 U/L      Alkaline Phosphatase 44 U/L      Total Bilirubin 0.6 mg/dL      eGFR Non African Amer 127 mL/min/1.73      Globulin 2.1 gm/dL      A/G Ratio 1.9 g/dL      BUN/Creatinine Ratio 15.4     Anion Gap 10.0 mmol/L     POC Glucose Once [[id number]]  (Abnormal) Collected: 11/15/20 2123    Specimen: Blood Updated: 11/15/20 2134     Glucose 228 mg/dL      Comment: : [id number] Ginger LisaMeter ID: [id number]       T4, Free [[id number]]  (Normal) Collected: 11/15/20 0809    Specimen: Blood Updated: 11/15/20 1729     Free T4 1.02 ng/dL     Narrative:      Results may be falsely increased if patient taking Biotin.      POC Glucose Once [[id number]]   (Abnormal) Collected: 11/15/20 1654    Specimen: Blood Updated: 11/15/20 1706     Glucose 175 mg/dL      Comment: : [id number] Nalini AnnMeter ID: [id number]       POC Glucose Once [[id number]]  (Abnormal) Collected: 11/15/20 1140    Specimen: Blood Updated: 11/15/20 1152     Glucose 131 mg/dL      Comment: : [id number] Nalini AnnMeter ID: [id number]       Lipid Panel [[id number]]  (Abnormal) Collected: 11/15/20 0809    Specimen: Blood Updated: 11/15/20 1105     Total Cholesterol 115 mg/dL      Triglycerides 110 mg/dL      HDL Cholesterol 26 mg/dL      LDL Cholesterol  68 mg/dL      VLDL Cholesterol 21 mg/dL      LDL/HDL Ratio 2.58    Narrative:      Hemoglobin A1c [[id number]]  (Abnormal) Collected: 11/15/20 0809    Specimen: Blood Updated: 11/15/20 1036     Hemoglobin A1C 7.60 %     Narrative:      Comprehensive Metabolic Panel [[id number]]  (Abnormal) Collected: 11/15/20 0809    Specimen: Blood Updated: 11/15/20 0854     Glucose 149 mg/dL      BUN 14 mg/dL      Creatinine 0.70 mg/dL      Sodium 141 mmol/L      Potassium 3.9 mmol/L      Chloride 103 mmol/L      CO2 29.0 mmol/L      Calcium 8.6 mg/dL      Total Protein 6.3 g/dL      Albumin 4.10 g/dL      ALT (SGPT) 110 U/L      AST (SGOT) 60 U/L      Alkaline Phosphatase 54 U/L      Total Bilirubin 0.4 mg/dL      eGFR Non African Amer 117 mL/min/1.73      Globulin 2.2 gm/dL      A/G Ratio 1.9 g/dL      BUN/Creatinine Ratio 20.0     Anion Gap 9.0 mmol/L     TSH [[id number]]  (Abnormal) Collected: 11/15/20 0809    Specimen: Blood Updated: 11/15/20 0854     TSH 4.630 uIU/mL     CBC Auto Differential [[id number]]  (Abnormal) Collected: 11/15/20 0809    Specimen: Blood Updated: 11/15/20 0827     WBC 9.15 10*3/mm3      RBC 6.09 10*6/mm3      Hemoglobin 17.3 g/dL      Hematocrit 53.6 %      MCV 88.0 fL      MCH 28.4 pg      MCHC 32.3 g/dL      RDW 15.4 %      RDW-SD 48.7 fl      MPV 10.6 fL      Platelets 162 10*3/mm3      Neutrophil % 59.9 %      Lymphocyte % 24.9 %       Monocyte % 9.8 %      Eosinophil % 3.7 %      Basophil % 1.2 %      Immature Grans % 0.5 %      Neutrophils, Absolute 5.47 10*3/mm3      Lymphocytes, Absolute 2.28 10*3/mm3      Monocytes, Absolute 0.90 10*3/mm3      Eosinophils, Absolute 0.34 10*3/mm3      Basophils, Absolute 0.11 10*3/mm3      Immature Grans, Absolute 0.05 10*3/mm3      nRBC 0.0 /100 WBC     POC Glucose Once [[id number]]  (Normal) Collected: 11/15/20 0800    Specimen: Blood Updated: 11/15/20 0812     Glucose 125 mg/dL      Comment: : [id number] Nalini AnnMeter ID: [id number]       COVID-19,[facility]-HOUSE,Nasal Swab No Transport Media 3-4 HR TAT - Swab, Nasal Cavity [[id number]]  (Normal) Collected: 11/14/20 1651    Specimen: Swab from Nasal Cavity Updated: 11/14/20 1859     COVID19 Not Detected    Narrative:      Fact sheet for providers: https://www.fda.gov/media/869489/download     Fact sheet for patients: https://www.fda.gov/media/572216/download    Troponin [[id number]]  (Normal) Collected: 11/14/20 1807    Specimen: Blood Updated: 11/14/20 1832     Troponin T <0.010 ng/mL     Comprehensive Metabolic Panel [[id number]]  (Abnormal) Collected: 11/14/20 1608    Specimen: Blood Updated: 11/14/20 1647     Glucose 135 mg/dL      BUN 13 mg/dL      Creatinine 0.72 mg/dL      Sodium 139 mmol/L      Potassium 3.8 mmol/L      Comment: Slight hemolysis detected by analyzer. Results may be affected.        Chloride 100 mmol/L      CO2 27.0 mmol/L      Calcium 9.1 mg/dL      Total Protein 7.0 g/dL      Albumin 4.50 g/dL      ALT (SGPT) 118 U/L      AST (SGOT) 60 U/L      Alkaline Phosphatase 72 U/L      Total Bilirubin 0.4 mg/dL      eGFR Non African Amer 113 mL/min/1.73      eGFR  African Amer 137 mL/min/1.73      Globulin 2.5 gm/dL      A/G Ratio 1.8 g/dL      BUN/Creatinine Ratio 18.1     Anion Gap 12.0 mmol/L     Troponin [[id number]]  (Normal) Collected: 11/14/20 1608    Specimen: Blood Updated: 11/14/20 1643     Troponin T <0.010 ng/mL     CBC Auto  Differential [[id number]]  (Abnormal) Collected: 11/14/20 1608    Specimen: Blood Updated: 11/14/20 1626     WBC 10.17 10*3/mm3      RBC 6.28 10*6/mm3      Hemoglobin 17.9 g/dL      Hematocrit 54.1 %      MCV 86.1 fL      MCH 28.5 pg      MCHC 33.1 g/dL      RDW 15.3 %      RDW-SD 45.8 fl      MPV 10.5 fL      Platelets 183 10*3/mm3      Neutrophil % 60.2 %      Lymphocyte % 26.0 %      Monocyte % 8.7 %      Eosinophil % 3.2 %      Basophil % 1.5 %      Immature Grans % 0.4 %      Neutrophils, Absolute 6.13 10*3/mm3      Lymphocytes, Absolute 2.64 10*3/mm3      Monocytes, Absolute 0.88 10*3/mm3      Eosinophils, Absolute 0.33 10*3/mm3      Basophils, Absolute 0.15 10*3/mm3      Immature Grans, Absolute 0.04 10*3/mm3      nRBC 0.0 /100 WBC         Imaging Results (All)     Procedure Component Value Units Date/Time    CT Angiogram Chest [[id number]] Collected: 11/14/20 1825     Updated: 11/14/20 1837    Narrative:      CT chest angiogram dated 11/14/2020 5:47 PM CST      HISTORY: Chest pain. Aortic dissection suspected.     COMPARISON: None.      DLP: 611 mGy cm. Automated exposure control was utilized to diminish  patient radiation dose.     TECHNIQUE: Helical tomographic images of the chest were obtained after  the administration of intravenous contrast following angiogram protocol.  Additionally, 3D MIP reconstructions in the coronal and sagittal planes  were provided.        FINDINGS:    There is nodularity within both lobes of the thyroid gland including a  1.7 cm centrally cystic nodule involving the posterior interpolar aspect  of the right lobe. If not previously evaluated follow-up with thyroid  ultrasound could be obtained. There is no evidence of adenopathy within  the supraclavicular fossa..      There is adequate enhancement of the pulmonary arteries to evaluate for  central and segmental pulmonary emboli. There are no filling defects  within the main, lobar, segmental or visualized subsegmental  pulmonary  arteries. The pulmonary vessels are within normal limits.      There is a 5 mm subpleural soft tissue nodule within the posterior  segment of the right upper lobe on image 54. There is also a 5 mm  intrafissural lymph node associated with the minor fissure on image 87  of the axial sequence. Left lingular atelectasis is present. Lungs are  otherwise clear. No evidence of consolidative pneumonia or effusion..  The trachea and bronchial tree are patent.      The heart and great vessels appear unremarkable. There is no pericardial  effusion. Some linear artifact involving the anterior ascending thoracic  aorta also projects outside of the vessel and is felt to be artifactual  in nature related to pulsation. No evidence of discrete intimal flap or  dissection. The proximal great vessels are unremarkable. No evidence of  aneurysm.     There is an 11 mm short axis node high within the right paratracheal  martin group. No additional enlarged axillary or mediastinal nodes are  present. No enlarged hilar adenopathy is present..      The osseous structures of the thorax and surrounding soft tissues  demonstrate no acute process.      There is steatosis of the liver. The adrenals are unremarkable. There is  a small hiatal hernia..        Impression:      1. No evidence of pulmonary embolus.  2. The thoracic aorta and proximal great vessels are normal in caliber.  No evidence of dissection or aneurysm.  3. There is a single enlarged high right paratracheal node measuring 11  mm in short axis. I suspect this is reactive in nature.  4. There are some enlargement of the thyroid gland with nodularity. If  not previously documented follow-up with thyroid ultrasound could be  obtained. There is a nodule involving the posterior interpolar aspect of  the right lobe measuring 1.7 cm in size..  5. Soft tissue nodule 5 mm in size which is subpleural in location  within the posterior segment of the right upper lobe. Follow-up  imaging  is recommended based on Fleischner guidelines.  Fleischner Society Recommendations for Management of SOLID Pulmonary  Nodules:     1.  If a nodule is less than or equal to 4 mm in size, low risk patients  require no follow up, and high risk patients require a follow-up CT of  the chest at 12 months.  2.  If a nodule is 5-6 mm in size, low risk patients require a follow-up  CT at 12 months, and high risk patients require follow up CT scans at  6-12 months and 18-24 months.  3.  If a nodule is 7-8 mm in size, low risk patients requiring follow-up  at 6-12 months and 18-24 months, and high risk patients requiring  follow-up at 3-6 months, 9-12 months and 24 months.  4.  If a nodule is greater than 8 mm in size, ALL patients require  follow-up at 3, 9 and 24 months. PET/CT or biopsy should also be  considered.        This report was finalized on 11/14/2020 18:34 by Dr. Vinnie Sigala MD.    XR Chest 1 View [[id number]] Collected: 11/14/20 1637     Updated: 11/14/20 1641    Narrative:      Frontal upright radiograph of the chest 11/14/2020 4:34 PM CST     HISTORY: Chest pain     COMPARISON: None.     FINDINGS:      No lung consolidation. No pleural effusion or pneumothorax. The  cardiomediastinal silhouette and pulmonary vascularity are within normal  limits. The osseous structures and surrounding soft tissues demonstrate  no acute abnormality. Chronic right lateral rib fractures.       Impression:      1. No radiographic evidence of acute cardiopulmonary process.        This report was finalized on 11/14/2020 16:38 by Dr Florian Betancourt, .        Results for orders placed during the hospital encounter of 11/14/20   Adult Transthoracic Echo Complete W/ Cont if Necessary Per Protocol    Narrative · Left ventricular ejection fraction appears to be 66 - 70%. Left   ventricular systolic function is normal.  · Left ventricular wall thickness is consistent with moderate concentric   hypertrophy.  · Normal size and  function of the right ventricle.  · Estimated right ventricular systolic pressure from tricuspid   regurgitation is normal (<35 mmHg).  · No significant valvular pathology.  · Left ventricular diastolic function was normal.        History of Present Illness on Day of Discharge: The patient states he is feeling much better.  He denies any chest pain.  He is eager for discharge home.    Hospital Course:  Mr. Tolbert is a 55-year-old  male who follows MEET Snow for primary care.  He has a medical history significant for diabetes mellitus type 2, insulin-dependent, hypertension, hyperlipidemia, anxiety/depression, BPH.  The patient presented to the [facility] emergency department on 11/14/2020 with complaints of chest pain which radiated to the left arm and up into the neck.  He did notice his blood pressure was elevated at home.  On arrival, the patient's blood pressure was 154/102.  Troponin value in the emergency department was normal.  Other laboratory work-up was essentially within normal limits as well.  COVID-19 testing negative.  A CTA of the chest was performed, which showed no evidence of Darrell embolus, dissection, or aneurysm.  There was a single enlarged high right paratracheal node measuring 1 mm, suspect reactive.  There was some enlargement of the thyroid gland with nodularity, a right lobe nodule measuring 1.7 cm.  There is also a soft tissue nodule within the posterior segment of the right upper lobe measuring 5 mm.  The patient was admitted to the hospital service for further evaluation and management.    The patient's medication lisinopril was continued, and Norvasc and Toprol-XL were added to his antihypertensive regimen.  A WN stress test was attempted on 11/15, and was unable to be performed due to elevated blood pressure.  On 11/16/2020, a Lexiscan myocardial perfusion stress test was performed, and results are consistent with a low risk study.  A transthoracic echocardiogram  "showed no significant valvular pathology, with normal size and function of the right interval.    The patient blood pressure is improved compared to admission, and his chest pain has resolved.  We did discuss the findings on his CT scan of fibroid nodule, for which she will need an ultrasound ordered by his primary care provider on an outpatient basis.  His TSH was very mildly elevated at 1.6 with normal free T4.  We also discussed lung nodule findings on CT scan.  Per management guidelines outlined by radiologist, for another 5 to 6 mm in size high risk patients require follow-up CT scan in 6 to 12 months 19-21.  Patient does have a smoking history.  Advised his primary care provider will need to follow-up with us as well.    Overall, patient is hemodynamically stable and appropriate for discharge home today.  He will follow department provider in 1 week.    Condition on Discharge: Medically stable    Physical Exam on Discharge:  /84 (BP Location: Right arm, Patient Position: Lying)   Pulse 67   Temp 97.6 °F (36.4 °C) (Oral)   Resp 16   Ht 182.9 cm (72\")   Wt 130 kg (285 lb 9.6 oz)   SpO2 94%   BMI 38.73 kg/m²   Physical Exam  Vitals signs and nursing note reviewed.   Constitutional:       Appearance: Normal appearance. He is obese. He is not ill-appearing.   HENT:      Head: Normocephalic.   Neck:      Musculoskeletal: Normal range of motion and neck supple. No muscular tenderness.   Cardiovascular:      Rate and Rhythm: Normal rate and regular rhythm.      Pulses: Normal pulses.      Heart sounds: Normal heart sounds.      Comments: Sinus 63-87 with PVCs  Pulmonary:      Effort: Pulmonary effort is normal.      Breath sounds: Normal breath sounds. No wheezing or rales.   Abdominal:      General: Bowel sounds are normal. There is no distension.      Palpations: Abdomen is soft.      Tenderness: There is no abdominal tenderness.   Musculoskeletal: Normal range of motion.         General: No swelling or " tenderness.   Skin:     General: Skin is warm and dry.      Findings: No erythema or rash.   Neurological:      General: No focal deficit present.      Mental Status: He is alert and oriented to person, place, and time.   Psychiatric:         Mood and Affect: Mood normal.         Behavior: Behavior normal.         Thought Content: Thought content normal.         Judgment: Judgment normal.       Discharge Disposition:  Home or Self Care    Discharge Medications:     Discharge Medications      New Medications      Instructions Start Date   amLODIPine 5 MG tablet  Commonly known as: NORVASC   5 mg, Oral, Every 24 Hours Scheduled   Start Date: November 18, 2020     metoprolol succinate XL 50 MG 24 hr tablet  Commonly known as: TOPROL-XL   50 mg, Oral, Every 24 Hours Scheduled   Start Date: November 18, 2020        Continue These Medications      Instructions Start Date   aspirin 81 MG chewable tablet   81 mg, Oral, Daily      busPIRone 15 MG tablet  Commonly known as: BUSPAR   15 mg, Oral, 2 times daily      DICLOFENAC SODIUM ER PO   75 mg, Oral, 2 times daily      fexofenadine 180 MG tablet  Commonly known as: ALLEGRA   180 mg, Oral, Daily      lisinopril 20 MG tablet  Commonly known as: PRINIVIL,ZESTRIL   20 mg, Oral, 2 times daily      metFORMIN  MG 24 hr tablet  Commonly known as: GLUCOPHAGE-XR   1,000 mg, Oral, 2 times daily      rosuvastatin 10 MG tablet  Commonly known as: CRESTOR   10 mg, Oral, Daily      Semaglutide(0.25 or 0.5MG/DOS) 2 MG/1.5ML solution pen-injector  Commonly known as: OZEMPIC   0.5 mg, Subcutaneous, Weekly, Sunday       tamsulosin 0.4 MG capsule 24 hr capsule  Commonly known as: FLOMAX   1 capsule, Oral, Daily      Tresiba 100 UNIT/ML solution injection  Generic drug: Insulin Degludec   100 Units, Subcutaneous, Daily      vitamin B-12 100 MCG tablet  Commonly known as: CYANOCOBALAMIN   50 mcg, Oral, Daily      vitamin D3 125 MCG (5000 UT) capsule capsule   5,000 Units, Oral, Daily            Discharge Diet:   Diet Instructions     Diet: Regular, Consistent Carbohydrate, Cardiac; Thin      Discharge Diet:  Regular  Consistent Carbohydrate  Cardiac       Fluid Consistency: Thin        Activity at Discharge:   Activity Instructions     Activity as Tolerated      Measure Blood Pressure      Take blood pressure readings at home at least once per day.  Keep a log of these measurements to take to follow-up with PCP.        Discharge Care Plan/Instructions:   1.  Return for any acute or worsening symptoms  2.  Patient to monitor his blood pressure readings at home, keep a diary of these measurements to discuss with PCP.  3.  Norvasc and Toprol-XL added to antihypertensive regimen.  4.  Patient will need follow-up outpatient thyroid ultrasound and repeat CT scan of the chest in 6 to 12 months.    Follow-up Appointments:   1.  Primary care provider in 1 week    Test Results Pending at Discharge: None    Electronically signed by MEET Marrero, 11/17/2020, 09:33 CST.    Time: 25 minutes

## 2020-11-17 NOTE — PLAN OF CARE
Problem: Adult Inpatient Plan of Care  Goal: Plan of Care Review  Outcome: Ongoing, Progressing  Flowsheets (Taken 11/17/2020 0549)  Progress: improving  Plan of Care Reviewed With: patient  Outcome Summary: VSS. 's systolic. c/o milk headache, did not request any prn med. rested well tonight on CPAP. NSR 63-87 on tele. safety maintained. continue to monitor.

## 2020-11-17 NOTE — PROGRESS NOTES
[facility] Medicine Services  INPATIENT PROGRESS NOTE    Patient Name: Agustín Finn  Date of Admission: 11/14/2020  Today's Date: 11/16/20  Length of Stay: 0  Primary Care Physician: Aurelia Devi APRN    Subjective   Chief Complaint: headache  HPI   Doing ok.  Has headcahe this AM.  No vision changes.  No CP or SOA.          Review of Systems   Constitutional: Negative for fatigue and fever.   HENT: Negative for congestion and ear pain.    Eyes: Negative for redness and visual disturbance.   Respiratory: Negative for cough, shortness of breath and wheezing.    Cardiovascular: Negative for chest pain and palpitations.   Gastrointestinal: Negative for abdominal pain, diarrhea, nausea and vomiting.   Endocrine: Negative for cold intolerance and heat intolerance.   Genitourinary: Negative for dysuria and frequency.   Musculoskeletal: Negative for arthralgias and back pain.   Skin: Negative for rash and wound.   Neurological: Negative for dizziness and headaches.   Psychiatric/Behavioral: Negative for confusion. The patient is not nervous/anxious.         All pertinent negatives and positives are as above. All other systems have been reviewed and are negative unless otherwise stated.     Objective    Temp:  [97.5 °F (36.4 °C)-98.7 °F (37.1 °C)] 97.5 °F (36.4 °C)  Heart Rate:  [61-83] 77  Resp:  [12-18] 18  BP: (134-166)/(81-98) 155/94  Physical Exam  Vitals signs reviewed.   Constitutional:       Appearance: He is well-developed.   HENT:      Head: Normocephalic and atraumatic.      Right Ear: External ear normal.      Left Ear: External ear normal.      Nose: Nose normal.   Eyes:      General: No scleral icterus.        Right eye: No discharge.         Left eye: No discharge.      Conjunctiva/sclera: Conjunctivae normal.      Pupils: Pupils are equal, round, and reactive to light.   Neck:      Musculoskeletal: Normal range of motion and neck supple.      Thyroid: No  thyromegaly.      Trachea: No tracheal deviation.   Cardiovascular:      Rate and Rhythm: Normal rate and regular rhythm.      Heart sounds: Normal heart sounds. No murmur. No friction rub. No gallop.    Pulmonary:      Effort: Pulmonary effort is normal. No respiratory distress.      Breath sounds: Normal breath sounds. No stridor. No wheezing or rales.   Chest:      Chest wall: No tenderness.   Abdominal:      General: Bowel sounds are normal. There is no distension.      Palpations: Abdomen is soft. There is no mass.      Tenderness: There is no abdominal tenderness. There is no guarding or rebound.      Hernia: No hernia is present.   Musculoskeletal: Normal range of motion.         General: No deformity.   Lymphadenopathy:      Cervical: No cervical adenopathy.   Skin:     General: Skin is warm and dry.      Coloration: Skin is not pale.      Findings: No erythema or rash.   Neurological:      Mental Status: He is alert and oriented to person, place, and time.      Cranial Nerves: No cranial nerve deficit.      Motor: No abnormal muscle tone.      Coordination: Coordination normal.      Deep Tendon Reflexes: Reflexes are normal and symmetric. Reflexes normal.   Psychiatric:         Behavior: Behavior normal.         Thought Content: Thought content normal.         Judgment: Judgment normal.             Results Review:  I have reviewed the labs, radiology results, and diagnostic studies.    Laboratory Data:   Results from last 7 days   Lab Units 11/16/20  0924 11/15/20  0809 11/14/20  1608   WBC 10*3/mm3 9.10 9.15 10.17   HEMOGLOBIN g/dL 17.3 17.3 17.9*   HEMATOCRIT % 52.3* 53.6* 54.1*   PLATELETS 10*3/mm3 176 162 183        Results from last 7 days   Lab Units 11/16/20  0742 11/15/20  0809 11/14/20  1608   SODIUM mmol/L 138 141 139   POTASSIUM mmol/L 4.2 3.9 3.8   CHLORIDE mmol/L 105 103 100   CO2 mmol/L 23.0 29.0 27.0   BUN mg/dL 10 14 13   CREATININE mg/dL 0.65* 0.70* 0.72*   CALCIUM mg/dL 8.4* 8.6 9.1    BILIRUBIN mg/dL 0.6 0.4 0.4   ALK PHOS U/L 44 54 72   ALT (SGPT) U/L 111* 110* 118*   AST (SGOT) U/L 60* 60* 60*   GLUCOSE mg/dL 142* 149* 135*       Culture Data:   No results found for: BLOODCX, URINECX, WOUNDCX, MRSACX, RESPCX, STOOLCX    Radiology Data:   Imaging Results (Last 24 Hours)     ** No results found for the last 24 hours. **          I have reviewed the patient's current medications.     Assessment/Plan     Active Hospital Problems    Diagnosis   • Accelerated hypertension   • Chest pain, atypical   • DM (diabetes mellitus) (CMS/[facility])   Labs reviewed:  Normal renal function, electrolytes    Telemetry reviewed    Telemetry independently interpreted by me:  NSR    EKG reviewed    Echo reviewed    1.  HTN  -Norvasc  -Lisinopril  -Metoprolol    2.  Chest pain  -Statin  -Metoprolol  -ASA  -Stress test    3.  T2DM  -SSI    4.  HLD  -Statin            Discharge Planning: I expect the patient to be discharged to home in 1 day    Electronically signed by Mauricio Arzate MD, 11/16/20, 18:40 CST.

## 2022-04-06 ENCOUNTER — NURSE TRIAGE (OUTPATIENT)
Dept: OTHER | Facility: CLINIC | Age: 57
End: 2022-04-06

## 2022-04-06 NOTE — TELEPHONE ENCOUNTER
Subjective: Caller states \"I am having some heart palpitations for the last week. \"    Current Symptoms:   +my normal resting HR is around 45-50   +my HR is 101 /75- HR 88 at time of triage   +\"normally the HR  is normal- between heartbeats -now it feels different\"   +\"I feel like I have an irregular heart rate\"   -denies chest pain   +\"tiny shortness of breath\"   -denies dizziness or sweating       Onset: 1 week ago; worsening    Associated Symptoms: NA    Pain Severity: 4/10; aching; waxing and waning  +inside of right leg     Temperature: denies     What has been tried: no medications or interventions     Recommended disposition: Go to ED Now    Care advice provided, patient verbalizes understanding; denies any other questions or concerns; instructed to call back for any new or worsening symptoms. Patient/caller agrees to proceed to local  Emergency Department    This triage is a result of a call to [address] [place] [address]. Please do not respond to the triage nurse through this encounter. Any subsequent communication should be directly with the patient.       Reason for Disposition   Difficulty breathing    Protocols used: HEART RATE AND HEARTBEAT QUESTIONS-ADULT-